# Patient Record
Sex: FEMALE | Race: WHITE | NOT HISPANIC OR LATINO | Employment: PART TIME | URBAN - NONMETROPOLITAN AREA
[De-identification: names, ages, dates, MRNs, and addresses within clinical notes are randomized per-mention and may not be internally consistent; named-entity substitution may affect disease eponyms.]

---

## 2017-08-15 ENCOUNTER — OFFICE VISIT (OUTPATIENT)
Dept: FAMILY MEDICINE CLINIC | Facility: CLINIC | Age: 22
End: 2017-08-15

## 2017-08-15 ENCOUNTER — LAB (OUTPATIENT)
Dept: LAB | Facility: OTHER | Age: 22
End: 2017-08-15

## 2017-08-15 VITALS
TEMPERATURE: 98.6 F | DIASTOLIC BLOOD PRESSURE: 62 MMHG | SYSTOLIC BLOOD PRESSURE: 128 MMHG | BODY MASS INDEX: 23.63 KG/M2 | OXYGEN SATURATION: 98 % | HEART RATE: 92 BPM | HEIGHT: 66 IN | WEIGHT: 147 LBS

## 2017-08-15 DIAGNOSIS — R03.1 LOW BLOOD PRESSURE, NOT HYPOTENSION: ICD-10-CM

## 2017-08-15 DIAGNOSIS — R53.83 FATIGUE, UNSPECIFIED TYPE: ICD-10-CM

## 2017-08-15 DIAGNOSIS — R03.1 LOW BLOOD PRESSURE, NOT HYPOTENSION: Primary | ICD-10-CM

## 2017-08-15 LAB
ANION GAP SERPL CALCULATED.3IONS-SCNC: 10 MMOL/L (ref 5–15)
BASOPHILS # BLD AUTO: 0.01 10*3/MM3 (ref 0–0.2)
BASOPHILS NFR BLD AUTO: 0.2 % (ref 0–2)
BUN BLD-MCNC: 11 MG/DL (ref 8–25)
BUN/CREAT SERPL: 12.2 (ref 7–25)
CALCIUM SPEC-SCNC: 9.7 MG/DL (ref 8.4–10.8)
CHLORIDE SERPL-SCNC: 102 MMOL/L (ref 100–112)
CO2 SERPL-SCNC: 29 MMOL/L (ref 20–32)
CREAT BLD-MCNC: 0.9 MG/DL (ref 0.4–1.3)
DEPRECATED RDW RBC AUTO: 41.3 FL (ref 36.4–46.3)
EOSINOPHIL # BLD AUTO: 0.12 10*3/MM3 (ref 0–0.7)
EOSINOPHIL NFR BLD AUTO: 2 % (ref 0–7)
ERYTHROCYTE [DISTWIDTH] IN BLOOD BY AUTOMATED COUNT: 13.4 % (ref 11.5–14.5)
GFR SERPL CREATININE-BSD FRML MDRD: 79 ML/MIN/1.73 (ref 71–165)
GLUCOSE BLD-MCNC: 102 MG/DL (ref 70–100)
HCT VFR BLD AUTO: 42 % (ref 35–45)
HGB BLD-MCNC: 14.1 G/DL (ref 12–15.5)
LYMPHOCYTES # BLD AUTO: 1.18 10*3/MM3 (ref 0.6–4.2)
LYMPHOCYTES NFR BLD AUTO: 20.1 % (ref 10–50)
MCH RBC QN AUTO: 29 PG (ref 26.5–34)
MCHC RBC AUTO-ENTMCNC: 33.6 G/DL (ref 31.4–36)
MCV RBC AUTO: 86.4 FL (ref 80–98)
MONOCYTES # BLD AUTO: 0.59 10*3/MM3 (ref 0–0.9)
MONOCYTES NFR BLD AUTO: 10.1 % (ref 0–12)
NEUTROPHILS # BLD AUTO: 3.96 10*3/MM3 (ref 2–8.6)
NEUTROPHILS NFR BLD AUTO: 67.6 % (ref 37–80)
PLATELET # BLD AUTO: 255 10*3/MM3 (ref 150–450)
PMV BLD AUTO: 10.4 FL (ref 8–12)
POTASSIUM BLD-SCNC: 4.3 MMOL/L (ref 3.4–5.4)
RBC # BLD AUTO: 4.86 10*6/MM3 (ref 3.77–5.16)
SODIUM BLD-SCNC: 141 MMOL/L (ref 134–146)
WBC NRBC COR # BLD: 5.86 10*3/MM3 (ref 3.2–9.8)

## 2017-08-15 PROCEDURE — 82607 VITAMIN B-12: CPT | Performed by: NURSE PRACTITIONER

## 2017-08-15 PROCEDURE — 84443 ASSAY THYROID STIM HORMONE: CPT | Performed by: NURSE PRACTITIONER

## 2017-08-15 PROCEDURE — 84439 ASSAY OF FREE THYROXINE: CPT | Performed by: NURSE PRACTITIONER

## 2017-08-15 PROCEDURE — 82728 ASSAY OF FERRITIN: CPT | Performed by: NURSE PRACTITIONER

## 2017-08-15 PROCEDURE — 83540 ASSAY OF IRON: CPT | Performed by: NURSE PRACTITIONER

## 2017-08-15 PROCEDURE — 82306 VITAMIN D 25 HYDROXY: CPT | Performed by: NURSE PRACTITIONER

## 2017-08-15 PROCEDURE — 99213 OFFICE O/P EST LOW 20 MIN: CPT | Performed by: NURSE PRACTITIONER

## 2017-08-15 PROCEDURE — 85025 COMPLETE CBC W/AUTO DIFF WBC: CPT | Performed by: NURSE PRACTITIONER

## 2017-08-15 PROCEDURE — 80048 BASIC METABOLIC PNL TOTAL CA: CPT | Performed by: NURSE PRACTITIONER

## 2017-08-15 PROCEDURE — 36415 COLL VENOUS BLD VENIPUNCTURE: CPT | Performed by: NURSE PRACTITIONER

## 2017-08-15 PROCEDURE — 82746 ASSAY OF FOLIC ACID SERUM: CPT | Performed by: NURSE PRACTITIONER

## 2017-08-15 NOTE — PROGRESS NOTES
Subjective   Tania Vital is a 21 y.o. female who presents to the office stating that she was unable to donate blood today because her blood pressure was too low, taken at 118/46.  Iron level was also checked by a fingerstick, was not informed it was low.  Denies that she feels bad.  Feels that she drinks enough fluids during the day.  Denies dizziness, lightheadedness.  Denies chest pain, shortness of breath.  Is in college and works several jobs.  Will nap often, whenever she can.  Mother present during exam.    History of Present Illness     The following portions of the patient's history were reviewed and updated as appropriate: allergies, current medications, past family history, past medical history, past social history, past surgical history and problem list.    Review of Systems   Constitutional: Positive for fatigue. Negative for chills and fever.   HENT: Negative for congestion, sneezing, sore throat and trouble swallowing.    Eyes: Negative for visual disturbance.   Respiratory: Negative for cough, chest tightness, shortness of breath and wheezing.    Cardiovascular: Negative for chest pain, palpitations and leg swelling.   Gastrointestinal: Negative for abdominal pain, constipation, diarrhea, nausea and vomiting.   Genitourinary: Negative for dysuria, frequency and urgency.   Musculoskeletal: Negative for neck pain.   Skin: Negative for rash.   Neurological: Negative for dizziness, weakness and headaches.   Psychiatric/Behavioral:        In the past two weeks the pt has not felt down, depressed, hopeless or lost interest in doing things   All other systems reviewed and are negative.      Objective   Physical Exam   Constitutional: She is oriented to person, place, and time. She appears well-developed and well-nourished. She is cooperative. She does not have a sickly appearance. She does not appear ill.   HENT:   Head: Normocephalic and atraumatic.   Right Ear: External ear normal.   Left Ear: External  ear normal.   Nose: Nose normal.   Mouth/Throat: Uvula is midline, oropharynx is clear and moist and mucous membranes are normal.   Eyes: Conjunctivae, EOM and lids are normal. Pupils are equal, round, and reactive to light. Right eye exhibits no discharge. Left eye exhibits no discharge. No scleral icterus.   Neck: Trachea normal, normal range of motion and phonation normal. Neck supple. No thyromegaly present.   Cardiovascular: Normal rate, regular rhythm, normal heart sounds and intact distal pulses.  Exam reveals no gallop and no friction rub.    No murmur heard.  Orthostatic BP readings:  Lying 122/64, sitting 120/68 and standing 120/62   Pulmonary/Chest: Effort normal and breath sounds normal. No respiratory distress. She has no wheezes. She has no rales.   Abdominal: Soft. Normal appearance and bowel sounds are normal. She exhibits no distension. There is no tenderness. There is no rebound and no guarding.   Musculoskeletal: Normal range of motion. She exhibits no edema.   Lymphadenopathy:     She has no cervical adenopathy.   Neurological: She is alert and oriented to person, place, and time. No cranial nerve deficit. GCS eye subscore is 4. GCS verbal subscore is 5. GCS motor subscore is 6.   Skin: Skin is warm, dry and intact. No rash noted.   Psychiatric: She has a normal mood and affect. Her behavior is normal. Judgment and thought content normal.   Nursing note and vitals reviewed.      Assessment/Plan   Tania was seen today for follow-up.    Diagnoses and all orders for this visit:    Low blood pressure, not hypotension  -     CBC & Differential; Future  -     T4, Free; Future  -     TSH; Future  -     Vitamin B12; Future  -     Vitamin D 25 Hydroxy; Future  -     Basic Metabolic Panel  -     Iron  -     Ferritin  -     Folate    Fatigue, unspecified type  -     CBC & Differential; Future  -     T4, Free; Future  -     TSH; Future  -     Vitamin B12; Future  -     Vitamin D 25 Hydroxy; Future  -      Basic Metabolic Panel  -     Iron  -     Ferritin  -     Folate

## 2017-08-16 LAB
25(OH)D3 SERPL-MCNC: 51.4 NG/ML (ref 30–100)
FERRITIN SERPL-MCNC: 30.7 NG/ML (ref 6.2–137)
FOLATE SERPL-MCNC: 7.04 NG/ML (ref 2.76–21)
IRON 24H UR-MRATE: 104 MCG/DL (ref 37–170)
T4 FREE SERPL-MCNC: 1.07 NG/DL (ref 0.78–2.19)
TSH SERPL DL<=0.05 MIU/L-ACNC: 3.59 MIU/ML (ref 0.46–4.68)
VIT B12 BLD-MCNC: 420 PG/ML (ref 239–931)

## 2018-09-24 ENCOUNTER — OFFICE VISIT (OUTPATIENT)
Dept: FAMILY MEDICINE CLINIC | Facility: CLINIC | Age: 23
End: 2018-09-24

## 2018-09-24 VITALS
SYSTOLIC BLOOD PRESSURE: 122 MMHG | OXYGEN SATURATION: 99 % | DIASTOLIC BLOOD PRESSURE: 68 MMHG | HEIGHT: 66 IN | WEIGHT: 154.2 LBS | HEART RATE: 74 BPM | TEMPERATURE: 98.8 F | BODY MASS INDEX: 24.78 KG/M2

## 2018-09-24 DIAGNOSIS — J32.9 SINUSITIS, UNSPECIFIED CHRONICITY, UNSPECIFIED LOCATION: Primary | ICD-10-CM

## 2018-09-24 PROCEDURE — 99213 OFFICE O/P EST LOW 20 MIN: CPT | Performed by: NURSE PRACTITIONER

## 2018-09-24 RX ORDER — AZITHROMYCIN 250 MG/1
TABLET, FILM COATED ORAL
Qty: 6 TABLET | Refills: 0 | Status: SHIPPED | OUTPATIENT
Start: 2018-09-24 | End: 2019-02-18

## 2018-10-08 PROBLEM — J32.9 SINUSITIS: Status: ACTIVE | Noted: 2018-10-08

## 2018-10-09 NOTE — PROGRESS NOTES
Subjective   Tania Vital is a 22 y.o. female who presents to the office complaining of sinus pressure that started last Thursday.  Does use nasal spray and has also been taking a daytime cold and flu product.  Tells me that her cough is worse in the morning.    History of Present Illness     The following portions of the patient's history were reviewed and updated as appropriate: allergies, current medications, past family history, past medical history, past social history, past surgical history and problem list.    Review of Systems   Constitutional: Negative for chills, fatigue and fever.   HENT: Positive for sinus pressure. Negative for congestion, sneezing, sore throat and trouble swallowing.    Eyes: Negative for visual disturbance.   Respiratory: Negative for cough, chest tightness, shortness of breath and wheezing.    Cardiovascular: Negative for chest pain, palpitations and leg swelling.   Gastrointestinal: Negative for abdominal pain, constipation, diarrhea, nausea and vomiting.   Genitourinary: Negative for dysuria, frequency and urgency.   Musculoskeletal: Negative for neck pain.   Skin: Negative for rash.   Neurological: Negative for dizziness, weakness and headaches.   Psychiatric/Behavioral:        In the past two weeks the pt has not felt down, depressed, hopeless or lost interest in doing things   All other systems reviewed and are negative.      Objective   Physical Exam   Constitutional: She is oriented to person, place, and time. She appears well-developed and well-nourished. She is cooperative.  Non-toxic appearance. She does not have a sickly appearance.   HENT:   Head: Normocephalic and atraumatic.   Right Ear: External ear normal.   Left Ear: External ear normal.   Nose: Right sinus exhibits maxillary sinus tenderness. Left sinus exhibits maxillary sinus tenderness.   Mouth/Throat: Uvula is midline, oropharynx is clear and moist and mucous membranes are normal.   Eyes: Pupils are equal,  round, and reactive to light. Conjunctivae, EOM and lids are normal. Right eye exhibits no discharge. Left eye exhibits no discharge. No scleral icterus.   Neck: Trachea normal, normal range of motion and phonation normal. Neck supple. No thyromegaly present.   Cardiovascular: Normal rate, regular rhythm, normal heart sounds and intact distal pulses.  Exam reveals no gallop and no friction rub.    No murmur heard.  Pulmonary/Chest: Effort normal and breath sounds normal. No respiratory distress. She has no wheezes. She has no rales.   Abdominal: Soft. Bowel sounds are normal. She exhibits no distension. There is no tenderness. There is no rebound and no guarding.   Musculoskeletal: Normal range of motion. She exhibits no edema.   Lymphadenopathy:     She has no cervical adenopathy.   Neurological: She is alert and oriented to person, place, and time. No cranial nerve deficit. GCS eye subscore is 4. GCS verbal subscore is 5. GCS motor subscore is 6.   Skin: Skin is warm and dry. No rash noted.   Psychiatric: She has a normal mood and affect. Her behavior is normal. Judgment and thought content normal.   Nursing note and vitals reviewed.      Assessment/Plan   Tania was seen today for sinusitis.    Diagnoses and all orders for this visit:    Sinusitis, unspecified chronicity, unspecified location    Other orders  -     loratadine-pseudoephedrine (GNP LORATADINE-D 12HR) 5-120 MG per 12 hr tablet; Take 1 tablet by mouth Every Morning.  -     azithromycin (ZITHROMAX Z-ANEESH) 250 MG tablet; Take 2 tablets the first day, then 1 tablet daily for 4 days.    Encouraged to cough and deep breathe.  Good handwashing.  No smoke exposure.

## 2019-04-23 ENCOUNTER — OFFICE VISIT (OUTPATIENT)
Dept: FAMILY MEDICINE CLINIC | Facility: CLINIC | Age: 24
End: 2019-04-23

## 2019-04-23 VITALS
HEIGHT: 66 IN | SYSTOLIC BLOOD PRESSURE: 110 MMHG | TEMPERATURE: 98.1 F | WEIGHT: 160 LBS | OXYGEN SATURATION: 99 % | HEART RATE: 67 BPM | DIASTOLIC BLOOD PRESSURE: 70 MMHG | BODY MASS INDEX: 25.71 KG/M2 | RESPIRATION RATE: 16 BRPM

## 2019-04-23 DIAGNOSIS — K58.0 IRRITABLE BOWEL SYNDROME WITH DIARRHEA: ICD-10-CM

## 2019-04-23 DIAGNOSIS — Z13.0 SCREENING FOR DEFICIENCY ANEMIA: Primary | ICD-10-CM

## 2019-04-23 DIAGNOSIS — Z13.1 SCREENING FOR DIABETES MELLITUS: ICD-10-CM

## 2019-04-23 DIAGNOSIS — N92.6 IRREGULAR MENSTRUAL CYCLE: ICD-10-CM

## 2019-04-23 DIAGNOSIS — Z13.29 SCREENING FOR THYROID DISORDER: ICD-10-CM

## 2019-04-23 DIAGNOSIS — J30.1 ALLERGIC RHINITIS DUE TO POLLEN, UNSPECIFIED SEASONALITY: ICD-10-CM

## 2019-04-23 DIAGNOSIS — N93.9 EPISODE OF HEAVY VAGINAL BLEEDING: ICD-10-CM

## 2019-04-23 DIAGNOSIS — Z13.220 SCREENING FOR HYPERLIPIDEMIA: ICD-10-CM

## 2019-04-23 LAB
ALBUMIN SERPL-MCNC: 4.3 G/DL (ref 3.5–5)
ALBUMIN/GLOB SERPL: 1.6 G/DL (ref 1.1–1.8)
ALP SERPL-CCNC: 56 U/L (ref 38–126)
ALT SERPL W P-5'-P-CCNC: 14 U/L
ANION GAP SERPL CALCULATED.3IONS-SCNC: 6 MMOL/L (ref 5–15)
AST SERPL-CCNC: 16 U/L (ref 14–36)
BASOPHILS # BLD AUTO: 0.02 10*3/MM3 (ref 0–0.2)
BASOPHILS NFR BLD AUTO: 0.3 % (ref 0–1.5)
BILIRUB SERPL-MCNC: 0.3 MG/DL (ref 0.2–1.3)
BUN BLD-MCNC: 9 MG/DL (ref 7–23)
BUN/CREAT SERPL: 12.3 (ref 7–25)
CALCIUM SPEC-SCNC: 9 MG/DL (ref 8.4–10.2)
CHLORIDE SERPL-SCNC: 104 MMOL/L (ref 101–112)
CHOLEST SERPL-MCNC: 154 MG/DL (ref 150–200)
CO2 SERPL-SCNC: 29 MMOL/L (ref 22–30)
CREAT BLD-MCNC: 0.73 MG/DL (ref 0.52–1.04)
DEPRECATED RDW RBC AUTO: 44.4 FL (ref 37–54)
EOSINOPHIL # BLD AUTO: 0.14 10*3/MM3 (ref 0–0.4)
EOSINOPHIL NFR BLD AUTO: 1.9 % (ref 0.3–6.2)
ERYTHROCYTE [DISTWIDTH] IN BLOOD BY AUTOMATED COUNT: 14.2 % (ref 12.3–15.4)
GFR SERPL CREATININE-BSD FRML MDRD: 99 ML/MIN/1.73 (ref 71–165)
GLOBULIN UR ELPH-MCNC: 2.7 GM/DL (ref 2.3–3.5)
GLUCOSE BLD-MCNC: 93 MG/DL (ref 70–99)
HCT VFR BLD AUTO: 42.3 % (ref 34–46.6)
HDLC SERPL-MCNC: 42 MG/DL (ref 40–59)
HGB BLD-MCNC: 14.1 G/DL (ref 12–15.9)
LDLC SERPL CALC-MCNC: 66 MG/DL
LDLC/HDLC SERPL: 1.58 {RATIO} (ref 0–3.22)
LYMPHOCYTES # BLD AUTO: 1.51 10*3/MM3 (ref 0.7–3.1)
LYMPHOCYTES NFR BLD AUTO: 20 % (ref 19.6–45.3)
MCH RBC QN AUTO: 29 PG (ref 26.6–33)
MCHC RBC AUTO-ENTMCNC: 33.3 G/DL (ref 31.5–35.7)
MCV RBC AUTO: 86.9 FL (ref 79–97)
MONOCYTES # BLD AUTO: 0.84 10*3/MM3 (ref 0.1–0.9)
MONOCYTES NFR BLD AUTO: 11.1 % (ref 5–12)
NEUTROPHILS # BLD AUTO: 5.05 10*3/MM3 (ref 1.7–7)
NEUTROPHILS NFR BLD AUTO: 66.7 % (ref 42.7–76)
PLATELET # BLD AUTO: 267 10*3/MM3 (ref 140–450)
PMV BLD AUTO: 9.9 FL (ref 6–12)
POTASSIUM BLD-SCNC: 4.1 MMOL/L (ref 3.4–5)
PROT SERPL-MCNC: 7 G/DL (ref 6.3–8.6)
RBC # BLD AUTO: 4.87 10*6/MM3 (ref 3.77–5.28)
SODIUM BLD-SCNC: 139 MMOL/L (ref 137–145)
TRIGL SERPL-MCNC: 229 MG/DL
VLDLC SERPL-MCNC: 45.8 MG/DL
WBC NRBC COR # BLD: 7.56 10*3/MM3 (ref 3.4–10.8)

## 2019-04-23 PROCEDURE — 84439 ASSAY OF FREE THYROXINE: CPT | Performed by: NURSE PRACTITIONER

## 2019-04-23 PROCEDURE — 36415 COLL VENOUS BLD VENIPUNCTURE: CPT | Performed by: NURSE PRACTITIONER

## 2019-04-23 PROCEDURE — 99214 OFFICE O/P EST MOD 30 MIN: CPT | Performed by: NURSE PRACTITIONER

## 2019-04-23 PROCEDURE — 80061 LIPID PANEL: CPT | Performed by: NURSE PRACTITIONER

## 2019-04-23 PROCEDURE — 85025 COMPLETE CBC W/AUTO DIFF WBC: CPT | Performed by: NURSE PRACTITIONER

## 2019-04-23 PROCEDURE — 84443 ASSAY THYROID STIM HORMONE: CPT | Performed by: NURSE PRACTITIONER

## 2019-04-23 PROCEDURE — 80053 COMPREHEN METABOLIC PANEL: CPT | Performed by: NURSE PRACTITIONER

## 2019-04-23 RX ORDER — TRIAMCINOLONE ACETONIDE 55 UG/1
2 SPRAY, METERED NASAL DAILY
Qty: 16.5 G | Refills: 5 | Status: SHIPPED | OUTPATIENT
Start: 2019-04-23 | End: 2019-10-31

## 2019-04-23 RX ORDER — DICYCLOMINE HYDROCHLORIDE 10 MG/1
10 CAPSULE ORAL 3 TIMES DAILY PRN
Qty: 60 CAPSULE | Refills: 5 | Status: SHIPPED | OUTPATIENT
Start: 2019-04-23 | End: 2019-07-23

## 2019-04-23 RX ORDER — NORETHINDRONE ACETATE AND ETHINYL ESTRADIOL 1MG-20(21)
1 KIT ORAL DAILY
Qty: 28 TABLET | Refills: 2 | Status: SHIPPED | OUTPATIENT
Start: 2019-04-23 | End: 2019-07-18

## 2019-04-23 RX ORDER — TRIAMCINOLONE ACETONIDE 55 UG/1
2 SPRAY, METERED NASAL DAILY
COMMUNITY
End: 2019-04-23 | Stop reason: SDUPTHER

## 2019-04-24 LAB
T4 FREE SERPL-MCNC: 1.32 NG/DL (ref 0.93–1.7)
TSH SERPL DL<=0.05 MIU/L-ACNC: 2.57 MIU/ML (ref 0.27–4.2)

## 2019-04-25 ENCOUNTER — TELEPHONE (OUTPATIENT)
Dept: FAMILY MEDICINE CLINIC | Facility: CLINIC | Age: 24
End: 2019-04-25

## 2019-04-26 ENCOUNTER — TELEPHONE (OUTPATIENT)
Dept: FAMILY MEDICINE CLINIC | Facility: CLINIC | Age: 24
End: 2019-04-26

## 2019-05-09 PROBLEM — N92.6 IRREGULAR MENSTRUAL CYCLE: Status: ACTIVE | Noted: 2019-05-09

## 2019-05-09 PROBLEM — K58.0 IRRITABLE BOWEL SYNDROME WITH DIARRHEA: Status: ACTIVE | Noted: 2019-05-09

## 2019-05-09 PROBLEM — J30.1 ALLERGIC RHINITIS DUE TO POLLEN: Status: ACTIVE | Noted: 2019-05-09

## 2019-05-09 PROBLEM — N93.9 EPISODE OF HEAVY VAGINAL BLEEDING: Status: ACTIVE | Noted: 2019-05-09

## 2019-05-09 NOTE — PROGRESS NOTES
Subjective   Tania Vital is a 23 y.o. female who presents to the office to establish care.    History of Present Illness     Patient states when she was younger she had a lot of pain with eating, did come down since then but she usually has a problem once a month with one meal.  Describes as sharp crampy abdominal pain generalized with diarrhea, usually only a couple of episodes of diarrhea and then it resolves, states mucus in the diarrhea at times.  Has not tried anything for it.    Patient states some increased stress in her life but is handling it okay states she also has some increased fatigue as well.    Patient states that she has been having really bad cramps in her menstrual cycle for the past 17 days, she states that this is very abnormal for her to have at this long, usually she has a heavy.  Normally patient has heavy to mild periods that last about 3 days, cramping is always bad and they usually occur at the first of the month regularly.  This the first time she has had this along with her menstrual cycle.  Patient states only changes that she can recall is that she does have an increased amount of stress.  Patient is not on birth control.    Allergic rhinitis-chronic, controlled with Nasacort.    No past surgical history    Past medical history:  -Recurrent URIs/bad sinus issues    Family history:  -Mom-breast cancer at age 51 and thyroid issues, high cholesterol,, hypertension  -Grandma on dad side-MI at old age  -Grandma on mom's side-MI at old age  -History of weak starts in the family per patient  -Uncle and aunt-pacemaker  -Dad-hypertension, hyperlipidemia    The following portions of the patient's history were reviewed and updated as appropriate: allergies, current medications, past family history, past medical history, past social history, past surgical history and problem list.    Review of Systems   Constitutional: Positive for fatigue. Negative for activity change, appetite change,  "chills, diaphoresis, fever and unexpected weight change.   HENT: Negative for congestion, ear discharge, ear pain, nosebleeds, postnasal drip, rhinorrhea, sinus pressure, sinus pain, sneezing, sore throat, tinnitus and trouble swallowing.    Eyes: Negative.    Respiratory: Negative for cough, chest tightness, shortness of breath and wheezing.    Cardiovascular: Negative for chest pain, palpitations and leg swelling.   Gastrointestinal: Positive for abdominal pain and diarrhea. Negative for abdominal distention, blood in stool, constipation, nausea and vomiting.   Genitourinary: Positive for menstrual problem and vaginal bleeding. Negative for difficulty urinating, dyspareunia, dysuria, flank pain, frequency, hematuria, vaginal discharge and vaginal pain.   Musculoskeletal: Negative for arthralgias, back pain, gait problem, joint swelling and myalgias.   Skin: Negative for rash and wound.   Allergic/Immunologic: Negative for environmental allergies, food allergies and immunocompromised state.   Neurological: Negative for dizziness, tremors, seizures, syncope, weakness, light-headedness, numbness and headaches.   Hematological: Does not bruise/bleed easily.   Psychiatric/Behavioral: Negative for behavioral problems, self-injury, sleep disturbance and suicidal ideas. The patient is nervous/anxious.          Objective   /70   Pulse 67   Temp 98.1 °F (36.7 °C) (Temporal)   Resp 16   Ht 167.6 cm (66\")   Wt 72.6 kg (160 lb)   LMP 04/06/2019   SpO2 99%   Breastfeeding? No   BMI 25.82 kg/m²   Physical Exam   Constitutional: She is oriented to person, place, and time. She appears well-developed and well-nourished. She is cooperative. No distress.   Cardiovascular: Normal rate, regular rhythm, normal heart sounds and intact distal pulses. Exam reveals no gallop and no friction rub.   No murmur heard.  Pulmonary/Chest: Effort normal and breath sounds normal. No respiratory distress. She has no wheezes. She has no " rales.   Abdominal: Soft. Normal appearance and bowel sounds are normal. She exhibits no shifting dullness, no distension, no pulsatile liver, no fluid wave, no abdominal bruit, no ascites, no pulsatile midline mass and no mass. There is no hepatosplenomegaly. There is no tenderness. There is no rigidity, no rebound, no guarding and no CVA tenderness. No hernia.   Neurological: She is alert and oriented to person, place, and time. She is not disoriented. Coordination and gait normal.   Skin: Skin is warm, dry and intact. Capillary refill takes less than 2 seconds. She is not diaphoretic. No pallor.   Psychiatric: She has a normal mood and affect. Her speech is normal and behavior is normal. Thought content normal. She is not actively hallucinating. Cognition and memory are normal.   Patient is dressed appropriately for weather and situation, makes eye contact, and engages in conversation.  She is attentive.   Nursing note and vitals reviewed.       PHQ-2/PHQ-9 Depression Screening 4/23/2019   Little interest or pleasure in doing things 0   Feeling down, depressed, or hopeless 0   Total Score 0         Assessment/Plan   Tania was seen today for establish care.    Diagnoses and all orders for this visit:    Screening for deficiency anemia  -     CBC & Differential  -     CBC Auto Differential    Irritable bowel syndrome with diarrhea  -     dicyclomine (BENTYL) 10 MG capsule; Take 1 capsule by mouth 3 (Three) Times a Day As Needed (diarrhea/cramping).    Screening for diabetes mellitus  -     Comprehensive Metabolic Panel    Screening for thyroid disorder  -     T4, Free  -     TSH    Irregular menstrual cycle  -     norethindrone-ethinyl estradiol FE (JUNEL FE 1/20) 1-20 MG-MCG per tablet; Take 1 tablet by mouth Daily.    Screening for hyperlipidemia  -     Lipid Panel    Episode of heavy vaginal bleeding    Allergic rhinitis due to pollen, unspecified seasonality    Other orders  -     Triamcinolone Acetonide  (NASACORT) 55 MCG/ACT nasal inhaler; 2 sprays into the nostril(s) as directed by provider Daily.            IBS with diarrhea-chronic, with rare intermittent episodes.  Uncontrolled.  Will try Bentyl as needed for these.    Fatigue- acute, worsening.  Will do lab work to reflect this complaint.    Allergic rhinitis-chronic, controlled with Nasacort.    Irregular painful menstrual cycle/prolonged vaginal bleeding.-acute, not improving.  -Collaborated with OB/GYN Carrie Oconnell, will start patient on birth control pills, patient will follow-up if bleeding does not stop in the next week, ordered lab work to assess for anemia.  Follow-up in 3 months.    Ordered lab work for screening purposes and history, will call patient with results.  Vaginal bleeding vaginal bleeding    Patient educated to follow-up sooner than next scheduled appointment if condition(s) worse or do not improve. Patient states understanding and is in agreeance with plan of care. An After Visit Summary was printed and given to the patient.      LELIA Galvin        This document has been electronically signed by LELIA Galvin on May 9, 2019 6:38 PM      EMR/Transcription Dragon Disclaimer:  Some of this note may be an electronic dragon transcription/translation of spoken language to printed text. The electronic translation of spoken language may permit erroneous, or at times, nonsensical words or phrases to be inadvertently transcribed. Although I have reviewed the note for such errors, some may still exist.

## 2019-05-16 NOTE — TELEPHONE ENCOUNTER
Called spoke with mom looked in media to verify that it was okay to release information I did see what she had signed in her chart that said we could release info to he mom

## 2019-07-18 ENCOUNTER — TELEPHONE (OUTPATIENT)
Dept: FAMILY MEDICINE CLINIC | Facility: CLINIC | Age: 24
End: 2019-07-18

## 2019-07-18 RX ORDER — NORETHINDRONE ACETATE AND ETHINYL ESTRADIOL 1; .02 MG/1; MG/1
1 TABLET ORAL DAILY
Qty: 21 TABLET | Refills: 1 | Status: SHIPPED | OUTPATIENT
Start: 2019-07-18 | End: 2019-07-23 | Stop reason: SDUPTHER

## 2019-07-18 NOTE — TELEPHONE ENCOUNTER
Pt called in has appt with you on the 07/23/19 and she has ran out of birthcontrol today     Her insurance also doesn't cover the BC with iron so please order it with out iron I tried to put it in wouldn't work for me pharmacy has also been verified

## 2019-07-23 ENCOUNTER — OFFICE VISIT (OUTPATIENT)
Dept: FAMILY MEDICINE CLINIC | Facility: CLINIC | Age: 24
End: 2019-07-23

## 2019-07-23 VITALS
DIASTOLIC BLOOD PRESSURE: 70 MMHG | TEMPERATURE: 97.7 F | BODY MASS INDEX: 25.39 KG/M2 | HEIGHT: 67 IN | HEART RATE: 76 BPM | SYSTOLIC BLOOD PRESSURE: 110 MMHG | WEIGHT: 161.8 LBS | OXYGEN SATURATION: 99 % | RESPIRATION RATE: 16 BRPM

## 2019-07-23 DIAGNOSIS — R53.83 FATIGUE, UNSPECIFIED TYPE: ICD-10-CM

## 2019-07-23 DIAGNOSIS — N93.9 EPISODE OF HEAVY VAGINAL BLEEDING: Primary | ICD-10-CM

## 2019-07-23 DIAGNOSIS — Z30.41 ENCOUNTER FOR SURVEILLANCE OF CONTRACEPTIVE PILLS: ICD-10-CM

## 2019-07-23 DIAGNOSIS — K58.0 IRRITABLE BOWEL SYNDROME WITH DIARRHEA: ICD-10-CM

## 2019-07-23 DIAGNOSIS — N92.6 IRREGULAR MENSTRUAL CYCLE: ICD-10-CM

## 2019-07-23 PROCEDURE — 99214 OFFICE O/P EST MOD 30 MIN: CPT | Performed by: NURSE PRACTITIONER

## 2019-07-23 RX ORDER — NORETHINDRONE ACETATE AND ETHINYL ESTRADIOL 1; .02 MG/1; MG/1
1 TABLET ORAL DAILY
Qty: 21 TABLET | Refills: 11 | Status: SHIPPED | OUTPATIENT
Start: 2019-07-23 | End: 2020-05-11

## 2019-08-07 PROBLEM — Z30.41 ENCOUNTER FOR SURVEILLANCE OF CONTRACEPTIVE PILLS: Status: ACTIVE | Noted: 2019-08-07

## 2019-08-07 NOTE — PROGRESS NOTES
Subjective   Tania Vital is a 23 y.o. female who presents to the office to follow-up on menstrual issues, fatigue, and IBS.    History of Present Illness     IBS with diarrhea-chronic, with rare intermittent episodes.  Controlled with Bentyl and probiotic  -Patient states when she was younger she had a lot of pain with eating, did come down since then but she usually has a problem once a month with one meal.  Describes as sharp crampy abdominal pain generalized with diarrhea, usually only a couple of episodes of diarrhea and then it resolves, states mucus in the diarrhea at times.  Has not tried anything for it.  -7/23/2019: Patient states that her mom told her to start on antibiotic which she started about 2 weeks ago taking Bentyl as needed and has had no trouble.  We will follow-up if issues.    Fatigue- acute, improving over time.  -Lab work-up done and within normal limits.    Irregular painful menstrual cycle/prolonged vaginal bleeding.-acute, improving on Janel 1/20  -At last visit: Patient states that she has been having really bad cramps in her menstrual cycle for the past 17 days, she states that this is very abnormal for her to have at this long, usually she has a heavy.  Normally patient has heavy to mild periods that last about 3 days, cramping is always bad and they usually occur at the first of the month regularly.  This the first time she has had this along with her menstrual cycle.  Patient states only changes that she can recall is that she does have an increased amount of stress.  Patient is not on birth control.  Plan of care: Collaborated with OB/GYN Carrie Oconnell, will start patient on birth control pills, patient will follow-up if bleeding does not stop in the next week, ordered lab work to assess for anemia.  Follow-up in 3 months.  -7/23/2019: Patient states she is having regular menstrual cycles and no longer prolonged.  States doing well on birth control.    Patient needs  tetanus shot does not want to get it done today will call to set up an appointment to get done.    Past medical history:  Allergic rhinitis-chronic, controlled with Nasacort.  -Recurrent URIs/bad sinus issues    Family history:  -Mom-breast cancer at age 51 and thyroid issues, high cholesterol,, hypertension  -Grandma on dad side-MI at old age  -Grandma on mom's side-MI at old age  -History of weak starts in the family per patient  -Uncle and aunt-pacemaker  -Dad-hypertension, hyperlipidemia    The following portions of the patient's history were reviewed and updated as appropriate: allergies, current medications, past family history, past medical history, past social history, past surgical history and problem list.    Review of Systems   Constitutional: Positive for fatigue (Improving). Negative for activity change, appetite change, chills, diaphoresis, fever and unexpected weight change.   HENT: Negative for congestion, ear discharge, ear pain, nosebleeds, postnasal drip, rhinorrhea, sinus pressure, sinus pain, sneezing, sore throat, tinnitus and trouble swallowing.    Eyes: Negative.    Respiratory: Negative for cough, chest tightness, shortness of breath and wheezing.    Cardiovascular: Negative for chest pain, palpitations and leg swelling.   Gastrointestinal: Negative for abdominal distention, abdominal pain, blood in stool, constipation, diarrhea, nausea and vomiting.   Genitourinary: Negative for difficulty urinating, dyspareunia, dysuria, flank pain, frequency, hematuria, menstrual problem, vaginal bleeding, vaginal discharge and vaginal pain.   Musculoskeletal: Negative for arthralgias, back pain, gait problem, joint swelling and myalgias.   Skin: Negative for rash and wound.   Allergic/Immunologic: Negative for environmental allergies, food allergies and immunocompromised state.   Neurological: Negative for dizziness, tremors, seizures, syncope, weakness, light-headedness, numbness and headaches.  "  Hematological: Does not bruise/bleed easily.   Psychiatric/Behavioral: Negative for behavioral problems, self-injury, sleep disturbance and suicidal ideas. The patient is nervous/anxious (Improving).          Objective   /70   Pulse 76   Temp 97.7 °F (36.5 °C) (Temporal)   Resp 16   Ht 168.9 cm (66.5\")   Wt 73.4 kg (161 lb 12.8 oz)   LMP 07/20/2019   SpO2 99%   BMI 25.72 kg/m²   Physical Exam   Constitutional: She is oriented to person, place, and time. She appears well-developed and well-nourished. She is cooperative. No distress.   Cardiovascular: Normal rate, regular rhythm, normal heart sounds and intact distal pulses. Exam reveals no gallop and no friction rub.   No murmur heard.  Pulmonary/Chest: Effort normal and breath sounds normal. No respiratory distress. She has no wheezes. She has no rales.   Abdominal: Soft. Normal appearance and bowel sounds are normal. She exhibits no shifting dullness, no distension, no pulsatile liver, no fluid wave, no abdominal bruit, no ascites, no pulsatile midline mass and no mass. There is no hepatosplenomegaly. There is no tenderness. There is no rigidity, no rebound, no guarding and no CVA tenderness. No hernia.   Neurological: She is alert and oriented to person, place, and time. She is not disoriented. Coordination and gait normal.   Skin: Skin is warm, dry and intact. Capillary refill takes less than 2 seconds. She is not diaphoretic. No pallor.   Psychiatric: She has a normal mood and affect. Her speech is normal and behavior is normal. Thought content normal. She is not actively hallucinating. Cognition and memory are normal.   Patient is dressed appropriately for weather and situation, makes eye contact, and engages in conversation.  She is attentive.   Nursing note and vitals reviewed.       PHQ-2/PHQ-9 Depression Screening 4/23/2019   Little interest or pleasure in doing things 0   Feeling down, depressed, or hopeless 0   Total Score 0 "         Assessment/Plan   Tania was seen today for follow-up.    Diagnoses and all orders for this visit:    Episode of heavy vaginal bleeding    Irregular menstrual cycle    Fatigue, unspecified type    Encounter for surveillance of contraceptive pills    Irritable bowel syndrome with diarrhea    Other orders  -     norethindrone-ethinyl estradiol (JUNEL 1/20) 1-20 MG-MCG per tablet; Take 1 tablet by mouth Daily.            IBS with diarrhea-chronic, with rare intermittent episodes.  Controlled with Bentyl and probiotic    Fatigue- acute, improving over time.  -Lab work-up done and within normal limits.    Irregular painful menstrual cycle/prolonged vaginal bleeding.-acute, controlled on Janel 1/20    Patient needs tetanus shot does not want to get it done today will call to set up an appointment to get done.    Follow-up in 1 year or sooner if needed    Patient educated to follow-up sooner than next scheduled appointment if condition(s) worse or do not improve. Patient states understanding and is in agreeance with plan of care. An After Visit Summary was printed and given to the patient.      LELIA Galvin        This document has been electronically signed by LELIA Galvin on August 7, 2019 11:54 AM      EMR/Transcription Dragon Disclaimer:  Some of this note may be an electronic dragon transcription/translation of spoken language to printed text. The electronic translation of spoken language may permit erroneous, or at times, nonsensical words or phrases to be inadvertently transcribed. Although I have reviewed the note for such errors, some may still exist.

## 2020-05-11 ENCOUNTER — OFFICE VISIT (OUTPATIENT)
Dept: FAMILY MEDICINE CLINIC | Facility: CLINIC | Age: 25
End: 2020-05-11

## 2020-05-11 DIAGNOSIS — R10.84 ABDOMINAL PAIN, GENERALIZED: ICD-10-CM

## 2020-05-11 DIAGNOSIS — R19.7 DIARRHEA, UNSPECIFIED TYPE: Primary | ICD-10-CM

## 2020-05-11 DIAGNOSIS — K58.0 IRRITABLE BOWEL SYNDROME WITH DIARRHEA: ICD-10-CM

## 2020-05-11 PROCEDURE — 99214 OFFICE O/P EST MOD 30 MIN: CPT | Performed by: NURSE PRACTITIONER

## 2020-05-11 RX ORDER — SUCRALFATE 1 G/1
1 TABLET ORAL 3 TIMES DAILY PRN
Qty: 90 TABLET | Refills: 0 | Status: SHIPPED | OUTPATIENT
Start: 2020-05-11 | End: 2020-06-12 | Stop reason: SDUPTHER

## 2020-05-11 RX ORDER — OMEPRAZOLE 40 MG/1
40 CAPSULE, DELAYED RELEASE ORAL DAILY
Qty: 30 CAPSULE | Refills: 0 | Status: SHIPPED | OUTPATIENT
Start: 2020-05-11 | End: 2020-06-12 | Stop reason: SDUPTHER

## 2020-05-11 RX ORDER — DICYCLOMINE HCL 20 MG
TABLET ORAL
Qty: 90 TABLET | Refills: 0 | Status: SHIPPED | OUTPATIENT
Start: 2020-05-11 | End: 2020-06-12 | Stop reason: SDUPTHER

## 2020-05-11 NOTE — PROGRESS NOTES
Subjective   Tania Vital is a 24 y.o. female who presents via telephone visit due to covid-19 for stomach aches and diarrhea x two weeks.     History of Present Illness     You have chosen to receive care through a telephone visit. Do you consent to use a telephone visit for your medical care today? Yes    Telephone visit lasted 13 mins.     IBS with diarrhea-chronic, with rare intermittent episodes.  -currently not taking with Bentyl and probiotic  -Patient states when she was younger she had a lot of pain with eating, did come down since then but she usually has a problem once a month with one meal.  Describes as sharp crampy abdominal pain generalized with diarrhea, usually only a couple of episodes of diarrhea and then it resolves, states mucus in the diarrhea at times.  Has not tried anything for it.  -7/23/2019: Patient states that her mom told her to start on antibiotic which she started about 2 weeks ago taking Bentyl as needed and has had no trouble.  We will follow-up if issues.  -5/11/20: pt states not the same thing as ibs falre up like what she is dcurrently having, see note below.     Stomach pain/diarrhea-acute, new problem, not improving  -5/11/20: x two weeks and now mucus like. Usually not this bad with IBS. Going 6-7x/days, sometimes doesn't have to eat to flare it up. Usually with IBS, it is only if she eats. Pain in stomach before ibs as well, stomach has been more irritated past few weeks but not sure why. Some relief of abd pain with defecation but only lasts 30mins or so. abd pain is sharp pain and all over and having some pain near ribs on right side and that just started yesterday. Diarrhea at first but consistency still different. Food does not make a difference on pain/diarrhea.   -off bentyl and probiotics at this time. Currently only taking daily vitamin and nasal allergy spray daily, off junel. Prefer meds first then work up, states hx of GB disease in family.   -POC: omeprazole  daily, bentyl and carafate prn. Labs (amylase/lipase/cbc/cmp/sed rate/hpylori and maybe stool specimen or gi panel) and u/s GB if not resolving, pt will call for these and refills if needed. Suggested to f/u if blood in stool. Drink plenty of water to stay hydrated. Drink 1-2 gatorades daily to replace electrolytes lost with diarrhea. F/u regardless if not mproving.     F/u if not improving.     __________________________________________________      Past Medical History:  Fatigue- acute, improving over time.  -Lab work-up done and within normal limi  Irregular painful menstrual cycle/prolonged vaginal bleeding.-acute, improving on Janel 1/20  -At last visit: Patient states that she has been having really bad cramps in her menstrual cycle for the past 17 days, she states that this is very abnormal for her to have at this long, usually she has a heavy.  Normally patient has heavy to mild periods that last about 3 days, cramping is always bad and they usually occur at the first of the month regularly.  This the first time she has had this along with her menstrual cycle.  Patient states only changes that she can recall is that she does have an increased amount of stress.  Patient is not on birth control.  Plan of care: Collaborated with OB/GYN Carrie Oconnell, will start patient on birth control pills, patient will follow-up if bleeding does not stop in the next week, ordered lab work to assess for anemia.  Follow-up in 3 months.  -7/23/2019: Patient states she is having regular menstrual cycles and no longer prolonged.  States doing well on birth control.  Allergic rhinitis-chronic, controlled with Nasacort.  -Recurrent URIs/bad sinus issues    Family history:  -Mom-breast cancer at age 51 and thyroid issues, high cholesterol,, hypertension  -Grandma on dad side-MI at old age  -Grandma on mom's side-MI at old age  -History of weak starts in the family per patient  -Uncle and aunt-pacemaker  -Dad-hypertension,  hyperlipidemia  -fam hx of GB disease    The following portions of the patient's history were reviewed and updated as appropriate: allergies, current medications, past family history, past medical history, past social history, past surgical history and problem list.    Review of Systems   Constitutional: Positive for appetite change and fatigue (Improving). Negative for activity change, chills, diaphoresis, fever and unexpected weight change.   HENT: Negative for congestion, ear discharge, ear pain, nosebleeds, postnasal drip, rhinorrhea, sinus pressure, sinus pain, sneezing, sore throat, tinnitus and trouble swallowing.    Eyes: Negative.    Respiratory: Negative for cough, chest tightness, shortness of breath and wheezing.    Cardiovascular: Negative for chest pain, palpitations and leg swelling.   Gastrointestinal: Positive for abdominal pain and diarrhea. Negative for abdominal distention, blood in stool, constipation, nausea and vomiting.   Genitourinary: Negative for difficulty urinating, dyspareunia, dysuria, flank pain, frequency, hematuria, menstrual problem, vaginal bleeding, vaginal discharge and vaginal pain.   Musculoskeletal: Negative for arthralgias, back pain, gait problem, joint swelling and myalgias.   Skin: Negative for rash and wound.   Allergic/Immunologic: Negative for environmental allergies, food allergies and immunocompromised state.   Neurological: Negative for dizziness, tremors, seizures, syncope, weakness, light-headedness, numbness and headaches.   Hematological: Does not bruise/bleed easily.   Psychiatric/Behavioral: Negative for behavioral problems, self-injury, sleep disturbance and suicidal ideas. The patient is nervous/anxious (Improving).          Objective   There were no vitals taken for this visit.Unable to complete physical exam due to telephone visit, pt is alert and oriented to person, she is pleasant and cooperative and tone of voice is consistent with previous visits in  the past.     PHQ-2/PHQ-9 Depression Screening 4/23/2019   Little interest or pleasure in doing things 0   Feeling down, depressed, or hopeless 0   Total Score 0         Assessment/Plan   Diagnoses and all orders for this visit:    Diarrhea, unspecified type  -     omeprazole (priLOSEC) 40 MG capsule; Take 1 capsule by mouth Daily.  -     sucralfate (CARAFATE) 1 g tablet; Take 1 tablet by mouth 3 (Three) Times a Day As Needed (stomach pain). Separate from vitamin by two hrs.  -     dicyclomine (Bentyl) 20 MG tablet; Take 1/2-1tab tid prn for diarrhea/abd pain.    Abdominal pain, generalized  -     omeprazole (priLOSEC) 40 MG capsule; Take 1 capsule by mouth Daily.  -     sucralfate (CARAFATE) 1 g tablet; Take 1 tablet by mouth 3 (Three) Times a Day As Needed (stomach pain). Separate from vitamin by two hrs.  -     dicyclomine (Bentyl) 20 MG tablet; Take 1/2-1tab tid prn for diarrhea/abd pain.    Irritable bowel syndrome with diarrhea            Plan of Care:    Please See History of Present Illness(HPI) above for Plan of Care (POC) for individualized diagnoses as well as when to follow up.       Patient educated to follow-up sooner than next scheduled appointment if condition(s) worse or do not improve. Patient states understanding and is in agreeance with plan of care. An After Visit Summary was printed and given to the patient.      LELIA Galvin        This document has been electronically signed by LELIA Galvin on May 11, 2020 12:04      EMR/Transcription Dragon Disclaimer:  Some of this note may be an electronic dragon transcription/translation of spoken language to printed text. The electronic translation of spoken language may permit erroneous, or at times, nonsensical words or phrases to be inadvertently transcribed. Although I have reviewed the note for such errors, some may still exist.

## 2020-05-15 ENCOUNTER — TELEPHONE (OUTPATIENT)
Dept: FAMILY MEDICINE CLINIC | Facility: CLINIC | Age: 25
End: 2020-05-15

## 2020-05-18 ENCOUNTER — TELEPHONE (OUTPATIENT)
Dept: FAMILY MEDICINE CLINIC | Facility: CLINIC | Age: 25
End: 2020-05-18

## 2020-05-18 ENCOUNTER — LAB (OUTPATIENT)
Dept: LAB | Facility: OTHER | Age: 25
End: 2020-05-18

## 2020-05-18 DIAGNOSIS — R10.84 ABDOMINAL PAIN, GENERALIZED: ICD-10-CM

## 2020-05-18 DIAGNOSIS — R19.7 DIARRHEA, UNSPECIFIED TYPE: Primary | ICD-10-CM

## 2020-05-18 DIAGNOSIS — R19.7 DIARRHEA, UNSPECIFIED TYPE: ICD-10-CM

## 2020-05-18 LAB
ALBUMIN SERPL-MCNC: 4.3 G/DL (ref 3.5–5)
ALBUMIN/GLOB SERPL: 1.5 G/DL (ref 1.1–1.8)
ALP SERPL-CCNC: 50 U/L (ref 38–126)
ALT SERPL W P-5'-P-CCNC: 12 U/L
AMYLASE SERPL-CCNC: 63 U/L (ref 30–110)
ANION GAP SERPL CALCULATED.3IONS-SCNC: 7 MMOL/L (ref 5–15)
AST SERPL-CCNC: 18 U/L (ref 14–36)
BASOPHILS # BLD AUTO: 0.02 10*3/MM3 (ref 0–0.2)
BASOPHILS NFR BLD AUTO: 0.3 % (ref 0–1.5)
BILIRUB SERPL-MCNC: 0.2 MG/DL (ref 0.2–1.3)
BUN BLD-MCNC: 7 MG/DL (ref 7–23)
BUN/CREAT SERPL: 9.5 (ref 7–25)
CALCIUM SPEC-SCNC: 9.2 MG/DL (ref 8.4–10.2)
CHLORIDE SERPL-SCNC: 107 MMOL/L (ref 101–112)
CO2 SERPL-SCNC: 28 MMOL/L (ref 22–30)
CREAT BLD-MCNC: 0.74 MG/DL (ref 0.52–1.04)
DEPRECATED RDW RBC AUTO: 41.7 FL (ref 37–54)
EOSINOPHIL # BLD AUTO: 0.13 10*3/MM3 (ref 0–0.4)
EOSINOPHIL NFR BLD AUTO: 1.7 % (ref 0.3–6.2)
ERYTHROCYTE [DISTWIDTH] IN BLOOD BY AUTOMATED COUNT: 13.3 % (ref 12.3–15.4)
ERYTHROCYTE [SEDIMENTATION RATE] IN BLOOD: 2 MM/HR (ref 0–20)
GFR SERPL CREATININE-BSD FRML MDRD: 96 ML/MIN/1.73 (ref 71–165)
GLOBULIN UR ELPH-MCNC: 2.8 GM/DL (ref 2.3–3.5)
GLUCOSE BLD-MCNC: 94 MG/DL (ref 70–99)
HCT VFR BLD AUTO: 41.6 % (ref 34–46.6)
HGB BLD-MCNC: 14.2 G/DL (ref 12–15.9)
LYMPHOCYTES # BLD AUTO: 1.38 10*3/MM3 (ref 0.7–3.1)
LYMPHOCYTES NFR BLD AUTO: 18.4 % (ref 19.6–45.3)
MCH RBC QN AUTO: 29.5 PG (ref 26.6–33)
MCHC RBC AUTO-ENTMCNC: 34.1 G/DL (ref 31.5–35.7)
MCV RBC AUTO: 86.3 FL (ref 79–97)
MONOCYTES # BLD AUTO: 0.79 10*3/MM3 (ref 0.1–0.9)
MONOCYTES NFR BLD AUTO: 10.6 % (ref 5–12)
NEUTROPHILS # BLD AUTO: 5.16 10*3/MM3 (ref 1.7–7)
NEUTROPHILS NFR BLD AUTO: 69 % (ref 42.7–76)
PLATELET # BLD AUTO: 270 10*3/MM3 (ref 140–450)
PMV BLD AUTO: 10.1 FL (ref 6–12)
POTASSIUM BLD-SCNC: 4 MMOL/L (ref 3.4–5)
PROT SERPL-MCNC: 7.1 G/DL (ref 6.3–8.6)
RBC # BLD AUTO: 4.82 10*6/MM3 (ref 3.77–5.28)
SODIUM BLD-SCNC: 142 MMOL/L (ref 137–145)
WBC NRBC COR # BLD: 7.48 10*3/MM3 (ref 3.4–10.8)

## 2020-05-18 PROCEDURE — 36415 COLL VENOUS BLD VENIPUNCTURE: CPT | Performed by: NURSE PRACTITIONER

## 2020-05-18 PROCEDURE — 80053 COMPREHEN METABOLIC PANEL: CPT | Performed by: NURSE PRACTITIONER

## 2020-05-18 PROCEDURE — 85651 RBC SED RATE NONAUTOMATED: CPT | Performed by: NURSE PRACTITIONER

## 2020-05-18 PROCEDURE — 85025 COMPLETE CBC W/AUTO DIFF WBC: CPT | Performed by: NURSE PRACTITIONER

## 2020-05-18 PROCEDURE — 83690 ASSAY OF LIPASE: CPT | Performed by: NURSE PRACTITIONER

## 2020-05-18 PROCEDURE — 86677 HELICOBACTER PYLORI ANTIBODY: CPT | Performed by: NURSE PRACTITIONER

## 2020-05-18 PROCEDURE — 82150 ASSAY OF AMYLASE: CPT | Performed by: NURSE PRACTITIONER

## 2020-05-19 ENCOUNTER — LAB (OUTPATIENT)
Dept: LAB | Facility: OTHER | Age: 25
End: 2020-05-19

## 2020-05-19 DIAGNOSIS — R10.84 ABDOMINAL PAIN, GENERALIZED: ICD-10-CM

## 2020-05-19 DIAGNOSIS — R19.7 DIARRHEA, UNSPECIFIED TYPE: ICD-10-CM

## 2020-05-19 LAB
HEMOCCULT STL QL: NEGATIVE
LIPASE SERPL-CCNC: 15 U/L (ref 13–60)

## 2020-05-19 PROCEDURE — 82270 OCCULT BLOOD FECES: CPT | Performed by: NURSE PRACTITIONER

## 2020-05-20 LAB
H PYLORI IGA SER IA-ACNC: <9 UNITS (ref 0–8.9)
H PYLORI IGG SER IA-ACNC: 0.27 INDEX VALUE (ref 0–0.79)

## 2020-05-27 ENCOUNTER — DOCUMENTATION (OUTPATIENT)
Dept: FAMILY MEDICINE CLINIC | Facility: CLINIC | Age: 25
End: 2020-05-27

## 2020-06-08 ENCOUNTER — HOSPITAL ENCOUNTER (OUTPATIENT)
Dept: NUCLEAR MEDICINE | Facility: HOSPITAL | Age: 25
Discharge: HOME | End: 2020-06-08

## 2020-06-08 DIAGNOSIS — R19.7 DIARRHEA, UNSPECIFIED TYPE: ICD-10-CM

## 2020-06-08 DIAGNOSIS — R10.84 ABDOMINAL PAIN, GENERALIZED: ICD-10-CM

## 2020-06-08 PROCEDURE — A9500 TC99M SESTAMIBI: HCPCS | Performed by: NURSE PRACTITIONER

## 2020-06-08 PROCEDURE — 0 TECHNETIUM SESTAMIBI: Performed by: NURSE PRACTITIONER

## 2020-06-08 PROCEDURE — 78226 HEPATOBILIARY SYSTEM IMAGING: CPT

## 2020-06-08 RX ADMIN — TECHNETIUM TC 99M SESTAMIBI 1 DOSE: 1 INJECTION INTRAVENOUS at 09:03

## 2020-06-12 ENCOUNTER — OFFICE VISIT (OUTPATIENT)
Dept: FAMILY MEDICINE CLINIC | Facility: CLINIC | Age: 25
End: 2020-06-12

## 2020-06-12 DIAGNOSIS — R10.84 ABDOMINAL PAIN, GENERALIZED: Primary | ICD-10-CM

## 2020-06-12 DIAGNOSIS — R19.7 DIARRHEA, UNSPECIFIED TYPE: ICD-10-CM

## 2020-06-12 PROCEDURE — 99442 PR PHYS/QHP TELEPHONE EVALUATION 11-20 MIN: CPT | Performed by: NURSE PRACTITIONER

## 2020-06-12 RX ORDER — OMEPRAZOLE 40 MG/1
40 CAPSULE, DELAYED RELEASE ORAL DAILY
Qty: 30 CAPSULE | Refills: 2 | Status: SHIPPED | OUTPATIENT
Start: 2020-06-12 | End: 2021-03-10

## 2020-06-12 RX ORDER — SUCRALFATE 1 G/1
1 TABLET ORAL 3 TIMES DAILY PRN
Qty: 90 TABLET | Refills: 2 | Status: SHIPPED | OUTPATIENT
Start: 2020-06-12 | End: 2021-03-10

## 2020-06-12 RX ORDER — DICYCLOMINE HCL 20 MG
TABLET ORAL
Qty: 90 TABLET | Refills: 2 | Status: SHIPPED | OUTPATIENT
Start: 2020-06-12 | End: 2021-03-10

## 2020-06-12 NOTE — PROGRESS NOTES
Subjective   Tania Vital is a 24 y.o. female who presents via telephone visit due to covid-19 for f/u on abd pain and diarrhea.     History of Present Illness     You have chosen to receive care through a telephone visit. Do you consent to use a telephone visit for your medical care today? Yes    Telephone visit lasted 13 mins.     Stomach pain/diarrhea-acute, new problem, improving  -5/11/20: x two weeks and now mucus like. Usually not this bad with IBS. Going 6-7x/days, sometimes doesn't have to eat to flare it up. Usually with IBS, it is only if she eats. Pain in stomach before ibs as well, stomach has been more irritated past few weeks but not sure why. Some relief of abd pain with defecation but only lasts 30mins or so. abd pain is sharp pain and all over and having some pain near ribs on right side and that just started yesterday. Diarrhea at first but consistency still different. Food does not make a difference on pain/diarrhea.   -off bentyl and probiotics at this time. Currently only taking daily vitamin and nasal allergy spray daily, off junel. Prefer meds first then work up, states hx of GB disease in family. POC: omeprazole daily, bentyl and carafate prn. Labs (amylase/lipase/cbc/cmp/sed rate/hpylori and maybe stool specimen or gi panel) and u/s GB if not resolving, pt will call for these and refills if needed. Suggested to f/u if blood in stool. Drink plenty of water to stay hydrated. Drink 1-2 gatorades daily to replace electrolytes lost with diarrhea. F/u regardless if not mproving.   -6/12/20: pt states carafate helps and omeprazole 40mg. She is taking the omeprazole daily and carafate as needed. States she has not been having diarrhea x two days. Bentyl helped with diarrhea.   -poC; with flarie up, ct abd pelvis contrast in future. Possible referral to gi in the future to be considered, pt will call for this. Dicyclomine and carafate prn. Omeprazole daily.     F/u prn.    ___________________________________    Past Medical History:  IBS with diarrhea-chronic, with rare intermittent episodes.  -currently not taking with Bentyl and probiotic  -Patient states when she was younger she had a lot of pain with eating, did come down since then but she usually has a problem once a month with one meal.  Describes as sharp crampy abdominal pain generalized with diarrhea, usually only a couple of episodes of diarrhea and then it resolves, states mucus in the diarrhea at times.  Has not tried anything for it.  -7/23/2019: Patient states that her mom told her to start on antibiotic which she started about 2 weeks ago taking Bentyl as needed and has had no trouble.  We will follow-up if issues.  -5/11/20: pt states not the same thing as ibs falre up like what she is dcurrently having, see note below.   Fatigue- acute, improving over time.  -Lab work-up done and within normal limi  Irregular painful menstrual cycle/prolonged vaginal bleeding.-acute, improving on Janel 1/20  -At last visit: Patient states that she has been having really bad cramps in her menstrual cycle for the past 17 days, she states that this is very abnormal for her to have at this long, usually she has a heavy.  Normally patient has heavy to mild periods that last about 3 days, cramping is always bad and they usually occur at the first of the month regularly.  This the first time she has had this along with her menstrual cycle.  Patient states only changes that she can recall is that she does have an increased amount of stress.  Patient is not on birth control.  Plan of care: Collaborated with OB/GYN Carrie Oconnell, will start patient on birth control pills, patient will follow-up if bleeding does not stop in the next week, ordered lab work to assess for anemia.  Follow-up in 3 months.  -7/23/2019: Patient states she is having regular menstrual cycles and no longer prolonged.  States doing well on birth  control.  Allergic rhinitis-chronic, controlled with Nasacort.  -Recurrent URIs/bad sinus issues    Family history:  -Mom-breast cancer at age 51 and thyroid issues, high cholesterol,, hypertension  -Grandma on dad side-MI at old age  -Grandma on mom's side-MI at old age  -History of weak starts in the family per patient  -Uncle and aunt-pacemaker  -Dad-hypertension, hyperlipidemia  -fam hx of GB disease    The following portions of the patient's history were reviewed and updated as appropriate: allergies, current medications, past family history, past medical history, past social history, past surgical history and problem list.    Review of Systems   Constitutional: Positive for appetite change and fatigue (Improving). Negative for activity change, chills, diaphoresis, fever and unexpected weight change.   HENT: Negative for congestion, ear discharge, ear pain, nosebleeds, postnasal drip, rhinorrhea, sinus pressure, sinus pain, sneezing, sore throat, tinnitus and trouble swallowing.    Eyes: Negative.    Respiratory: Negative for cough, chest tightness, shortness of breath and wheezing.    Cardiovascular: Negative for chest pain, palpitations and leg swelling.   Gastrointestinal: Negative for abdominal distention, abdominal pain, blood in stool, constipation, diarrhea, nausea and vomiting.   Genitourinary: Negative for difficulty urinating, dyspareunia, dysuria, flank pain, frequency, hematuria, menstrual problem, vaginal bleeding, vaginal discharge and vaginal pain.   Musculoskeletal: Negative for arthralgias, back pain, gait problem, joint swelling and myalgias.   Skin: Negative for rash and wound.   Allergic/Immunologic: Negative for environmental allergies, food allergies and immunocompromised state.   Neurological: Negative for dizziness, tremors, seizures, syncope, weakness, light-headedness, numbness and headaches.   Hematological: Does not bruise/bleed easily.   Psychiatric/Behavioral: Negative for  behavioral problems, self-injury, sleep disturbance and suicidal ideas. The patient is nervous/anxious (Improving).          Objective   There were no vitals taken for this visit.Unable to complete physical exam due to telephone visit, pt is alert and oriented to person, she is pleasant and cooperative and tone of voice is consistent with previous visits in the past.       Assessment/Plan   Diagnoses and all orders for this visit:    Abdominal pain, generalized  -     omeprazole (priLOSEC) 40 MG capsule; Take 1 capsule by mouth Daily.  -     sucralfate (CARAFATE) 1 g tablet; Take 1 tablet by mouth 3 (Three) Times a Day As Needed (stomach pain). Separate from vitamin by two hrs.  -     dicyclomine (Bentyl) 20 MG tablet; Take 1/2-1tab tid prn for diarrhea/abd pain.    Diarrhea, unspecified type  -     omeprazole (priLOSEC) 40 MG capsule; Take 1 capsule by mouth Daily.  -     sucralfate (CARAFATE) 1 g tablet; Take 1 tablet by mouth 3 (Three) Times a Day As Needed (stomach pain). Separate from vitamin by two hrs.  -     dicyclomine (Bentyl) 20 MG tablet; Take 1/2-1tab tid prn for diarrhea/abd pain.            Plan of Care:    Please See History of Present Illness(HPI) above for Plan of Care (POC) for individualized diagnoses as well as when to follow up.       Patient educated to follow-up sooner than next scheduled appointment if condition(s) worse or do not improve. Patient states understanding and is in agreeance with plan of care. An After Visit Summary was printed and given to the patient.      LELIA Galvin        This document has been electronically signed by LELIA Galvin on June 29, 2020 01:39      EMR/Transcription Dragon Disclaimer:  Some of this note may be an electronic dragon transcription/translation of spoken language to printed text. The electronic translation of spoken language may permit erroneous, or at times, nonsensical words or phrases to be inadvertently transcribed. Although  I have reviewed the note for such errors, some may still exist.

## 2021-01-27 ENCOUNTER — OFFICE VISIT (OUTPATIENT)
Dept: SURGERY | Facility: CLINIC | Age: 26
End: 2021-01-27

## 2021-01-27 VITALS
BODY MASS INDEX: 29.15 KG/M2 | TEMPERATURE: 99.3 F | WEIGHT: 181.4 LBS | DIASTOLIC BLOOD PRESSURE: 82 MMHG | SYSTOLIC BLOOD PRESSURE: 118 MMHG | HEIGHT: 66 IN | HEART RATE: 72 BPM

## 2021-01-27 DIAGNOSIS — R10.30 LOWER ABDOMINAL PAIN: Primary | ICD-10-CM

## 2021-01-27 PROCEDURE — 99213 OFFICE O/P EST LOW 20 MIN: CPT | Performed by: NURSE PRACTITIONER

## 2021-01-27 RX ORDER — CETIRIZINE HYDROCHLORIDE 10 MG/1
10 TABLET ORAL DAILY
COMMUNITY
End: 2021-09-08

## 2021-01-27 NOTE — PROGRESS NOTES
Subjective   Tania Vital is a 25 y.o. female     Chief Complaint: Abdominal pain.    Ms. Vital has had cramping type generalized abdominal pain for at least 10 years but states it is becoming more frequent and bothersome.  Has tried multiple medications with no success.  Denies relation to food consumption or bowel movements. Does have diarrhea chronically that she describes mucous-like.  She denies blood in her stool and denies family  history of colon cancer.  She has had HIDA  In May 2020 which showed 99% gallbladder EF and no documentation of subjective symptoms.        Abdominal Pain  This is a chronic problem. The current episode started more than 1 year ago. The problem occurs intermittently. The problem has been unchanged. The pain is located in the generalized abdominal region and RLQ. The pain is at a severity of 4/10. The pain is moderate. The quality of the pain is cramping. The abdominal pain does not radiate. Associated symptoms include diarrhea and nausea. Pertinent negatives include no constipation, dysuria, fever or vomiting. Nothing aggravates the pain. The pain is relieved by nothing. She has tried proton pump inhibitors (Bentyl, carafate, prilosec) for the symptoms. The treatment provided no relief. Prior diagnostic workup includes ultrasound.        Review of Systems   Constitutional: Negative.  Negative for chills and fever.   HENT: Negative.    Eyes: Negative.    Respiratory: Negative.  Negative for shortness of breath.    Cardiovascular: Negative.    Gastrointestinal: Positive for abdominal pain, diarrhea and nausea. Negative for abdominal distention, blood in stool, constipation and vomiting.   Endocrine: Negative.    Genitourinary: Negative.  Negative for difficulty urinating, dysuria and menstrual problem.   Musculoskeletal: Negative.    Skin: Negative for color change, pallor and rash.   Allergic/Immunologic: Negative.    Neurological: Negative.  Negative for light-headedness.    Hematological: Negative.      Past Medical History:   Diagnosis Date   • Allergic    • Allergic rhinitis    • Diarrhea    • Headache    • Otalgia of right ear     due to pustule      • Visual impairment      History reviewed. No pertinent surgical history.  Family History   Problem Relation Age of Onset   • Cancer Mother    • Hypertension Mother    • Thyroid disease Mother    • Hypertension Father    • Heart disease Maternal Grandfather    • Heart disease Paternal Grandfather      Social History     Socioeconomic History   • Marital status: Single     Spouse name: Not on file   • Number of children: Not on file   • Years of education: Not on file   • Highest education level: Not on file   Tobacco Use   • Smoking status: Never Smoker   • Smokeless tobacco: Never Used   • Tobacco comment: No smokers in the household.   Substance and Sexual Activity   • Alcohol use: No   • Drug use: No     Allergies   Allergen Reactions   • Penicillins Rash   • Other      Vantyn     Vitals:    01/27/21 0914   BP: 118/82   Pulse: 72   Temp: 99.3 °F (37.4 °C)       Home Medications:  Prior to Admission medications    Medication Sig Start Date End Date Taking? Authorizing Provider   cetirizine (zyrTEC) 10 MG tablet Take 10 mg by mouth Daily.   Yes ProviderGeena MD   Unable to find 1 each 1 (One) Time. Med Name: Nasacort nasal spray every morning   Yes ProviderGeena MD   dicyclomine (Bentyl) 20 MG tablet Take 1/2-1tab tid prn for diarrhea/abd pain. 6/12/20   Angelita Jimenez APRN   omeprazole (priLOSEC) 40 MG capsule Take 1 capsule by mouth Daily. 6/12/20   Angelita Jimenez APRN   sucralfate (CARAFATE) 1 g tablet Take 1 tablet by mouth 3 (Three) Times a Day As Needed (stomach pain). Separate from vitamin by two hrs. 6/12/20   Angelita Jimenez APRN       Objective   Physical Exam  Vitals signs reviewed.   Constitutional:       Appearance: Normal appearance.   HENT:      Head: Normocephalic and atraumatic.    Cardiovascular:      Rate and Rhythm: Normal rate.   Pulmonary:      Effort: Pulmonary effort is normal. No respiratory distress.   Abdominal:      General: Abdomen is flat. Bowel sounds are normal. There is no distension. There are no signs of injury.      Palpations: Abdomen is soft. There is no mass.      Tenderness: There is abdominal tenderness in the right lower quadrant. There is no guarding or rebound. Negative signs include McBurney's sign.      Hernia: No hernia is present.   Skin:     General: Skin is warm and dry.   Neurological:      General: No focal deficit present.      Mental Status: She is alert and oriented to person, place, and time.   Psychiatric:         Mood and Affect: Mood normal.         Behavior: Behavior normal.         Thought Content: Thought content normal.         Judgment: Judgment normal.         Assessment/Plan      The encounter diagnosis was Lower abdominal pain.  1. Abdominal pain- Obtain CT imaging and will follow up in one week. Recommended colonoscopy but she is not agreeable at this time.  Patient to complete GI distress panel at her convenience.  She is instructed to return to office or seek further medical treatment if symptoms worsen prior to follow up.         Patient's Body mass index is 29.28 kg/m². BMI is above normal parameters. Recommendations include: educational material.              This document has been electronically signed by LELIA Buckley on January 27, 2021 14:58 CST

## 2021-01-27 NOTE — PATIENT INSTRUCTIONS
"BMI for Adults  What is BMI?  Body mass index (BMI) is a number that is calculated from a person's weight and height. BMI can help estimate how much of a person's weight is composed of fat. BMI does not measure body fat directly. Rather, it is an alternative to procedures that directly measure body fat, which can be difficult and expensive.  BMI can help identify people who may be at higher risk for certain medical problems.  What are BMI measurements used for?  BMI is used as a screening tool to identify possible weight problems. It helps determine whether a person is obese, overweight, a healthy weight, or underweight.  BMI is useful for:  · Identifying a weight problem that may be related to a medical condition or may increase the risk for medical problems.  · Promoting changes, such as changes in diet and exercise, to help reach a healthy weight. BMI screening can be repeated to see if these changes are working.  How is BMI calculated?  BMI involves measuring your weight in relation to your height. Both height and weight are measured, and the BMI is calculated from those numbers. This can be done either in English ([place] or metric measurements. Note that charts and online BMI calculators are available to help you find your BMI quickly and easily without having to do these calculations yourself.  To calculate your BMI in English ([place] measurements:    1. Measure your weight in pounds (lb).  2. Multiply the number of pounds by 703.  ? For example, for a person who weighs 180 lb, multiply that number by 703, which equals 126,540.  3. Measure your height in inches. Then multiply that number by itself to get a measurement called \"inches squared.\"  ? For example, for a person who is 70 inches tall, the \"inches squared\" measurement is 70 inches x 70 inches, which equals 4,900 inches squared.  4. Divide the total from step 2 (number of lb x 703) by the total from step 3 (inches squared): 126,540 ÷ 4,900 = 25.8. This is " "your BMI.  To calculate your BMI in metric measurements:  1. Measure your weight in kilograms (kg).  2. Measure your height in meters (m). Then multiply that number by itself to get a measurement called \"meters squared.\"  ? For example, for a person who is 1.75 m tall, the \"meters squared\" measurement is 1.75 m x 1.75 m, which is equal to 3.1 meters squared.  3. Divide the number of kilograms (your weight) by the meters squared number. In this example: 70 ÷ 3.1 = 22.6. This is your BMI.  What do the results mean?  BMI charts are used to identify whether you are underweight, normal weight, overweight, or obese. The following guidelines will be used:  · Underweight: BMI less than 18.5.  · Normal weight: BMI between 18.5 and 24.9.  · Overweight: BMI between 25 and 29.9.  · Obese: BMI of 30 or above.  Keep these notes in mind:  · Weight includes both fat and muscle, so someone with a muscular build, such as an athlete, may have a BMI that is higher than 24.9. In cases like these, BMI is not an accurate measure of body fat.  · To determine if excess body fat is the cause of a BMI of 25 or higher, further assessments may need to be done by a health care provider.  · BMI is usually interpreted in the same way for men and women.  Where to find more information  For more information about BMI, including tools to quickly calculate your BMI, go to these websites:  · Centers for Disease Control and Prevention: www.cdc.gov  · American Heart Association: www.heart.org  · National Heart, Lung, and Blood [place]: www.nhlbi.nih.gov  Summary  · Body mass index (BMI) is a number that is calculated from a person's weight and height.  · BMI may help estimate how much of a person's weight is composed of fat. BMI can help identify those who may be at higher risk for certain medical problems.  · BMI can be measured using English measurements or metric measurements.  · BMI charts are used to identify whether you are underweight, normal " weight, overweight, or obese.  This information is not intended to replace advice given to you by your health care provider. Make sure you discuss any questions you have with your health care provider.  Document Revised: 09/09/2020 Document Reviewed: 07/17/2020  Elsevier Patient Education © 2020 [employer]

## 2021-02-05 DIAGNOSIS — R10.30 LOWER ABDOMINAL PAIN: Primary | ICD-10-CM

## 2021-02-08 ENCOUNTER — LAB (OUTPATIENT)
Dept: LAB | Facility: OTHER | Age: 26
End: 2021-02-08

## 2021-02-08 ENCOUNTER — HOSPITAL ENCOUNTER (OUTPATIENT)
Dept: CT IMAGING | Facility: HOSPITAL | Age: 26
Discharge: HOME | End: 2021-02-08
Admitting: NURSE PRACTITIONER

## 2021-02-08 DIAGNOSIS — R10.30 LOWER ABDOMINAL PAIN: ICD-10-CM

## 2021-02-08 PROCEDURE — 83516 IMMUNOASSAY NONANTIBODY: CPT | Performed by: NURSE PRACTITIONER

## 2021-02-08 PROCEDURE — 25010000002 IOPAMIDOL 61 % SOLUTION: Performed by: NURSE PRACTITIONER

## 2021-02-08 PROCEDURE — 86003 ALLG SPEC IGE CRUDE XTRC EA: CPT | Performed by: NURSE PRACTITIONER

## 2021-02-08 PROCEDURE — 74177 CT ABD & PELVIS W/CONTRAST: CPT

## 2021-02-08 PROCEDURE — 36415 COLL VENOUS BLD VENIPUNCTURE: CPT | Performed by: NURSE PRACTITIONER

## 2021-02-08 RX ADMIN — IOPAMIDOL 90 ML: 612 INJECTION, SOLUTION INTRAVENOUS at 15:09

## 2021-02-09 LAB
GLIADIN PEPTIDE IGA SER-ACNC: 3 UNITS (ref 0–19)
GLIADIN PEPTIDE IGG SER-ACNC: 3 UNITS (ref 0–19)
TTG IGA SER-ACNC: <2 U/ML (ref 0–3)
TTG IGG SER-ACNC: 3 U/ML (ref 0–5)

## 2021-02-10 LAB
CODFISH IGE QN: <0.1 KU/L
CONV CLASS DESCRIPTION: ABNORMAL
COW MILK IGE QN: <0.1 KU/L
EGG WHITE IGE QN: 0.12 KU/L
GLUTEN IGE QN: <0.1 KU/L
HAZELNUT IGE QN: <0.1 KU/L
PEANUT IGE QN: <0.1 KU/L
SCALLOP IGE QN: <0.1 KU/L
SESAME SEED IGE QN: <0.1 KU/L
SHRIMP IGE QN: <0.1 KU/L
SOYBEAN IGE QN: <0.1 KU/L
WALNUT IGE QN: <0.1 KU/L
WHEAT IGE QN: <0.1 KU/L

## 2021-02-15 ENCOUNTER — OFFICE VISIT (OUTPATIENT)
Dept: SURGERY | Facility: CLINIC | Age: 26
End: 2021-02-15

## 2021-02-15 DIAGNOSIS — R10.30 LOWER ABDOMINAL PAIN: Primary | ICD-10-CM

## 2021-02-15 PROCEDURE — 99441 PR PHYS/QHP TELEPHONE EVALUATION 5-10 MIN: CPT | Performed by: NURSE PRACTITIONER

## 2021-02-15 NOTE — PROGRESS NOTES
"You have chosen to receive care through a telephone visit. Do you consent to use a telephone visit for your medical care today? Yes      Chief Complaint  Follow-up (abdominal pain, CT results)    Subjective          Tania Vital presents to [facility] GENERAL SURGERY  History of Present Illness  Ms. Vital presents for follow up to review CT results.  She presented several weeks ago with chronic intermittent abdominal pain. She describes as unrelated to food consumption. She states since her last visit she has not had any \"flare ups\"  of abdominal pain. She denies any constipation. She does have chronic mucous like diarrhea.  She denies blood in stool or nausea.  Denies family history of colon cancer. She also had negative HIDA scan in 2020.       Review of Systems   Constitutional: Negative for chills, fatigue and fever.   Respiratory: Negative for shortness of breath.    Gastrointestinal: Positive for diarrhea. Negative for abdominal distention, abdominal pain, blood in stool, constipation, nausea and vomiting.     Objective   Vital Signs:   There were no vitals taken for this visit.    Physical Exam  Vitals signs reviewed.   Neurological:      General: No focal deficit present.      Mental Status: She is oriented to person, place, and time.   Psychiatric:         Thought Content: Thought content normal.      Unable to perform physical exam due to   Result Review :       Data reviewed: Radiologic studies CT abdomen and pelvis and GI studies GI recurrent distress panel        CT results unremarkable, recurrent GI distress panel resulted as unremarkable with egg white very minimally elevated.       Assessment and Plan    Diagnoses and all orders for this visit:    1. Lower abdominal pain (Primary)  -     Ambulatory Referral to Gastroenterology    Referral placed to GI for follow up.  She would not like to proceed with EGD/colonscopy at this time.          Follow Up   Return if symptoms " worsen or fail to improve.     Patient was given instructions and counseling regarding her condition or for health maintenance advice. Please see specific information pulled into the AVS if appropriate.                 This document has been electronically signed by LELIA Buckley on February 15, 2021 11:12 CST       This visit has been rescheduled as a phone visit to comply with patient safety concerns in accordance with CDC recommendations. Total time of discussion was 5 minutes.

## 2021-02-22 ENCOUNTER — OFFICE VISIT (OUTPATIENT)
Dept: GASTROENTEROLOGY | Facility: CLINIC | Age: 26
End: 2021-02-22

## 2021-02-22 VITALS
HEART RATE: 84 BPM | SYSTOLIC BLOOD PRESSURE: 135 MMHG | WEIGHT: 183.8 LBS | DIASTOLIC BLOOD PRESSURE: 81 MMHG | BODY MASS INDEX: 29.54 KG/M2 | HEIGHT: 66 IN

## 2021-02-22 DIAGNOSIS — R19.7 DIARRHEA, UNSPECIFIED TYPE: ICD-10-CM

## 2021-02-22 DIAGNOSIS — R10.84 GENERALIZED ABDOMINAL PAIN: Primary | ICD-10-CM

## 2021-02-22 DIAGNOSIS — R19.5 MUCUS IN STOOL: ICD-10-CM

## 2021-02-22 DIAGNOSIS — R19.8 ABNORMAL BOWEL HABITS: ICD-10-CM

## 2021-02-22 PROCEDURE — 99214 OFFICE O/P EST MOD 30 MIN: CPT | Performed by: NURSE PRACTITIONER

## 2021-02-22 RX ORDER — DEXTROSE AND SODIUM CHLORIDE 5; .45 G/100ML; G/100ML
30 INJECTION, SOLUTION INTRAVENOUS CONTINUOUS PRN
Status: CANCELLED | OUTPATIENT
Start: 2021-03-15

## 2021-02-22 NOTE — PROGRESS NOTES
"Chief Complaint   Patient presents with   • Abdominal Pain   • Diarrhea       Subjective    Tania Vital is a 25 y.o. female. she is being seen for consultation today at the request of LELIA Trevino     25-year-old female presents to discuss abdominal pain diarrhea and mucus within her stool.  Reports symptoms have occurred intermittently over the last few years but most severe episode was last May.  Symptoms recently restarted she has had normal ultrasound, HIDA scan at 99% and recent CT which showed no acute abnormality.    Abdominal Pain  This is a chronic problem. The current episode started more than 1 year ago. The onset quality is gradual. The problem occurs intermittently. The pain is located in the periumbilical region. The pain is moderate. The quality of the pain is sharp (\"feels like pin prick all over abdomen\"). Associated symptoms include belching (bloating ), diarrhea, flatus, frequency and nausea. Pertinent negatives include no anorexia, arthralgias, constipation, dysuria, fever, headaches, hematochezia, hematuria, melena, myalgias, vomiting or weight loss. Nothing (occurs with and without intake ) aggravates the pain. She has tried antacids, H2 blockers and proton pump inhibitors for the symptoms. The treatment provided mild relief. Prior diagnostic workup includes CT scan and ultrasound (HIDA ).   Diarrhea   This is a chronic problem. The current episode started more than 1 year ago. The problem occurs 2 to 4 times per day (4-5 times per day ). The problem has been waxing and waning. The stool consistency is described as mucous. The patient states that diarrhea awakens her from sleep. Associated symptoms include abdominal pain, bloating and increased flatus. Pertinent negatives include no arthralgias, chills, coughing, fever, headaches, myalgias, sweats, URI, vomiting or weight loss. She has tried anti-motility drug, increased fluids and change of diet for the symptoms.     Plan; " schedule patient for EGD and colonoscopy and concern for inflammatory bowel disease.  Continue PPI daily dicyclomine.  Consider Lomotil pending results of the above.       The following portions of the patient's history were reviewed and updated as appropriate:   Past Medical History:   Diagnosis Date   • Allergic    • Allergic rhinitis    • Diarrhea    • Headache    • Otalgia of right ear     due to pustule      • Visual impairment      No past surgical history on file.  Family History   Problem Relation Age of Onset   • Cancer Mother    • Hypertension Mother    • Thyroid disease Mother    • Hypertension Father    • Heart disease Maternal Grandfather    • Heart disease Paternal Grandfather      OB History    No obstetric history on file.       Prior to Admission medications    Medication Sig Start Date End Date Taking? Authorizing Provider   cetirizine (zyrTEC) 10 MG tablet Take 10 mg by mouth Daily.   Yes Geena Solano MD   dicyclomine (Bentyl) 20 MG tablet Take 1/2-1tab tid prn for diarrhea/abd pain. 6/12/20  Yes Angelita Jimenez APRN   sucralfate (CARAFATE) 1 g tablet Take 1 tablet by mouth 3 (Three) Times a Day As Needed (stomach pain). Separate from vitamin by two hrs. 6/12/20  Yes Angelita Jimenez APRN   Unable to find 1 each 1 (One) Time. Med Name: Nasacort nasal spray every morning   Yes ProviderGeena MD   omeprazole (priLOSEC) 40 MG capsule Take 1 capsule by mouth Daily. 6/12/20   Angelita Jimenez APRN     Allergies   Allergen Reactions   • Penicillins Rash   • Other      Vantyn     Social History     Socioeconomic History   • Marital status: Single     Spouse name: Not on file   • Number of children: Not on file   • Years of education: Not on file   • Highest education level: Not on file   Tobacco Use   • Smoking status: Never Smoker   • Smokeless tobacco: Never Used   • Tobacco comment: No smokers in the household.   Substance and Sexual Activity   • Alcohol use: No   • Drug use:  "No       Review of Systems  Review of Systems   Constitutional: Negative for chills, fever and weight loss.   Respiratory: Negative for cough.    Gastrointestinal: Positive for abdominal pain, bloating, diarrhea, flatus and nausea. Negative for anorexia, constipation, hematochezia, melena and vomiting.   Genitourinary: Positive for frequency. Negative for dysuria and hematuria.   Musculoskeletal: Negative for arthralgias and myalgias.   Neurological: Negative for headaches.        /81 (BP Location: Left arm)   Pulse 84   Ht 167.6 cm (66\")   Wt 83.4 kg (183 lb 12.8 oz)   BMI 29.67 kg/m²     Objective    Physical Exam  Constitutional:       General: She is not in acute distress.     Appearance: Normal appearance. She is well-developed.   HENT:      Head: Normocephalic and atraumatic.   Neck:      Musculoskeletal: Normal range of motion and neck supple.      Thyroid: No thyromegaly.   Pulmonary:      Effort: Pulmonary effort is normal.   Abdominal:      General: Bowel sounds are normal. There is no distension.      Palpations: Abdomen is soft. Abdomen is not rigid.      Tenderness: There is generalized abdominal tenderness and tenderness in the right lower quadrant, epigastric area and left upper quadrant. There is no guarding.      Hernia: No hernia is present.   Skin:     General: Skin is warm and dry.      Coloration: Skin is not pale.      Findings: No rash.   Neurological:      Mental Status: She is alert and oriented to person, place, and time.   Psychiatric:         Speech: Speech normal.         Behavior: Behavior is cooperative.       Lab on 02/08/2021   Component Date Value Ref Range Status   • Class Description 02/08/2021 Comment   Final        Levels of Specific IgE       Class  Description of Class      ---------------------------  -----  --------------------                     < 0.10         0         Negative             0.10 -    0.31         0/I       Equivocal/Low             0.32 -    0.55 "         I         Low             0.56 -    1.40         II        Moderate             1.41 -    3.90         III       High             3.91 -   19.00         IV        Very High            19.01 -  100.00         V         Very High                    >100.00         VI        Very High   • Egg White 02/08/2021 0.12* Class 0/I kU/L Final   • Milk, Cow's 02/08/2021 <0.10  Class 0 kU/L Final   • CodFish 02/08/2021 <0.10  Class 0 kU/L Final   • Sesame Seed 02/08/2021 <0.10  Class 0 kU/L Final   • Peanut 02/08/2021 <0.10  Class 0 kU/L Final   • Soybean 02/08/2021 <0.10  Class 0 kU/L Final   • Hazelnut 02/08/2021 <0.10  Class 0 kU/L Final   • Shrimp 02/08/2021 <0.10  Class 0 kU/L Final   • Scallop 02/08/2021 <0.10  Class 0 kU/L Final   • Gluten 02/08/2021 <0.10  Class 0 kU/L Final   • [place] 02/08/2021 <0.10  Class 0 kU/L Final   • Wheat 02/08/2021 <0.10  Class 0 kU/L Final   • Gliadin Deamidated Peptide Ab, IgA 02/08/2021 3  0 - 19 units Final                       Negative                   0 - 19                     Weak Positive             20 - 30                     Moderate to Strong Positive   >30   • Deaminated Gliadin Ab IgG 02/08/2021 3  0 - 19 units Final                       Negative                   0 - 19                     Weak Positive             20 - 30                     Moderate to Strong Positive   >30   • Tissue Transglutaminase IgA 02/08/2021 <2  0 - 3 U/mL Final                                  Negative        0 -  3                                Weak Positive   4 - 10                                Positive           >10   Tissue Transglutaminase (tTG) has been identified   as the endomysial antigen.  Studies have demonstr-   ated that endomysial IgA antibodies have over 99%   specificity for gluten sensitive enteropathy.   • Tissue Transglutaminase IgG 02/08/2021 3  0 - 5 U/mL Final                                  Negative        0 - 5                                Weak Positive   6 -  9                                Positive           >9     Assessment/Plan      1. Generalized abdominal pain    2. Abnormal bowel habits    3. Diarrhea, unspecified type    4. Mucus in stool    .       Orders placed during this encounter include:  Orders Placed This Encounter   Procedures   • Follow Anesthesia Guidelines / Standing Orders     Standing Status:   Future   • Obtain Informed Consent     Standing Status:   Future     Order Specific Question:   Informed Consent Given For     Answer:   ESOPHAGOGASTRODUODENOSCOPY and Colonoscopy       ESOPHAGOGASTRODUODENOSCOPY Monday (N/A), COLONOSCOPY (N/A)    Review and/or summary of lab tests, radiology, procedures, medications. Review and summary of old records and obtaining of history. The risks and benefits of my recommendations, as well as other treatment options were discussed with the patient today. Questions were answered.    New Medications Ordered This Visit   Medications   • Sod Picosulfate-Mag Ox-Cit Acd 10-3.5-12 MG-GM -GM/160ML solution     Sig: Take 1 bottle by mouth 1 (One) Time for 1 dose. See admin instructions     Dispense:  160 mL     Refill:  0       Follow-up: Return in about 4 weeks (around 3/22/2021) for Recheck, After test.          This document has been electronically signed by LELIA Boogie on February 22, 2021 10:54 CST           I spent 35 minutes caring for Tania on this date of service. This time includes time spent by me in the following activities:preparing for the visit, reviewing tests, obtaining and/or reviewing a separately obtained history, performing a medically appropriate examination and/or evaluation , counseling and educating the patient/family/caregiver, ordering medications, tests, or procedures, referring and communicating with other health care professionals , documenting information in the medical record and care coordination    Results for orders placed or performed in visit on 02/08/21   Glia(IgA / G) & TTG(IgA /  G)    Specimen: Blood   Result Value Ref Range    Gliadin Deamidated Peptide Ab, IgA 3 0 - 19 units    Deaminated Gliadin Ab IgG 3 0 - 19 units    Tissue Transglutaminase IgA <2 0 - 3 U/mL    Tissue Transglutaminase IgG 3 0 - 5 U/mL   Allergens (12) Foods    Specimen: Blood   Result Value Ref Range    Class Description Comment     Egg White 0.12 (A) Class 0/I kU/L    Milk, Cow's <0.10 Class 0 kU/L    CodFish <0.10 Class 0 kU/L    Sesame Seed <0.10 Class 0 kU/L    Peanut <0.10 Class 0 kU/L    Soybean <0.10 Class 0 kU/L    Hazelnut <0.10 Class 0 kU/L    Shrimp <0.10 Class 0 kU/L    Scallop <0.10 Class 0 kU/L    Gluten <0.10 Class 0 kU/L    [place] <0.10 Class 0 kU/L    Wheat <0.10 Class 0 kU/L   Results for orders placed or performed in visit on 05/19/20   Occult Blood X 1, Stool - Stool, Per Rectum    Specimen: Per Rectum; Stool   Result Value Ref Range    Fecal Occult Blood Negative Negative   Results for orders placed or performed in visit on 05/18/20   H.pylori,IgG / IgA Antibodies    Specimen: Blood   Result Value Ref Range    H. pylori, IgA ABS <9.0 0.0 - 8.9 units    H. pylori IgG 0.27 0.00 - 0.79 Index Value   CBC Auto Differential    Specimen: Blood   Result Value Ref Range    WBC 7.48 3.40 - 10.80 10*3/mm3    RBC 4.82 3.77 - 5.28 10*6/mm3    Hemoglobin 14.2 12.0 - 15.9 g/dL    Hematocrit 41.6 34.0 - 46.6 %    MCV 86.3 79.0 - 97.0 fL    MCH 29.5 26.6 - 33.0 pg    MCHC 34.1 31.5 - 35.7 g/dL    RDW 13.3 12.3 - 15.4 %    RDW-SD 41.7 37.0 - 54.0 fl    MPV 10.1 6.0 - 12.0 fL    Platelets 270 140 - 450 10*3/mm3    Neutrophil % 69.0 42.7 - 76.0 %    Lymphocyte % 18.4 (L) 19.6 - 45.3 %    Monocyte % 10.6 5.0 - 12.0 %    Eosinophil % 1.7 0.3 - 6.2 %    Basophil % 0.3 0.0 - 1.5 %    Neutrophils, Absolute 5.16 1.70 - 7.00 10*3/mm3    Lymphocytes, Absolute 1.38 0.70 - 3.10 10*3/mm3    Monocytes, Absolute 0.79 0.10 - 0.90 10*3/mm3    Eosinophils, Absolute 0.13 0.00 - 0.40 10*3/mm3    Basophils, Absolute 0.02 0.00 - 0.20  10*3/mm3   Sedimentation Rate    Specimen: Blood   Result Value Ref Range    Sed Rate 2 0 - 20 mm/hr   Lipase    Specimen: Blood   Result Value Ref Range    Lipase 15 13 - 60 U/L   Amylase    Specimen: Blood   Result Value Ref Range    Amylase 63 30 - 110 U/L   Comprehensive metabolic panel    Specimen: Blood   Result Value Ref Range    Glucose 94 70 - 99 mg/dL    BUN 7 7 - 23 mg/dL    Creatinine 0.74 0.52 - 1.04 mg/dL    Sodium 142 137 - 145 mmol/L    Potassium 4.0 3.4 - 5.0 mmol/L    Chloride 107 101 - 112 mmol/L    CO2 28.0 22.0 - 30.0 mmol/L    Calcium 9.2 8.4 - 10.2 mg/dL    Total Protein 7.1 6.3 - 8.6 g/dL    Albumin 4.30 3.50 - 5.00 g/dL    ALT (SGPT) 12 <=35 U/L    AST (SGOT) 18 14 - 36 U/L    Alkaline Phosphatase 50 38 - 126 U/L    Total Bilirubin 0.2 0.2 - 1.3 mg/dL    eGFR Non  Amer 96 71 - 165 mL/min/1.73    Globulin 2.8 2.3 - 3.5 gm/dL    A/G Ratio 1.5 1.1 - 1.8 g/dL    BUN/Creatinine Ratio 9.5 7.0 - 25.0    Anion Gap 7.0 5.0 - 15.0 mmol/L   Results for orders placed or performed in visit on 04/23/19   CBC Auto Differential    Specimen: Blood   Result Value Ref Range    WBC 7.56 3.40 - 10.80 10*3/mm3    RBC 4.87 3.77 - 5.28 10*6/mm3    Hemoglobin 14.1 12.0 - 15.9 g/dL    Hematocrit 42.3 34.0 - 46.6 %    MCV 86.9 79.0 - 97.0 fL    MCH 29.0 26.6 - 33.0 pg    MCHC 33.3 31.5 - 35.7 g/dL    RDW 14.2 12.3 - 15.4 %    RDW-SD 44.4 37.0 - 54.0 fl    MPV 9.9 6.0 - 12.0 fL    Platelets 267 140 - 450 10*3/mm3    Neutrophil % 66.7 42.7 - 76.0 %    Lymphocyte % 20.0 19.6 - 45.3 %    Monocyte % 11.1 5.0 - 12.0 %    Eosinophil % 1.9 0.3 - 6.2 %    Basophil % 0.3 0.0 - 1.5 %    Neutrophils, Absolute 5.05 1.70 - 7.00 10*3/mm3    Lymphocytes, Absolute 1.51 0.70 - 3.10 10*3/mm3    Monocytes, Absolute 0.84 0.10 - 0.90 10*3/mm3    Eosinophils, Absolute 0.14 0.00 - 0.40 10*3/mm3    Basophils, Absolute 0.02 0.00 - 0.20 10*3/mm3   TSH    Specimen: Blood   Result Value Ref Range    TSH 2.570 0.270 - 4.200 mIU/mL   T4,  Free    Specimen: Blood   Result Value Ref Range    Free T4 1.32 0.93 - 1.70 ng/dL   Lipid Panel    Specimen: Blood   Result Value Ref Range    Total Cholesterol 154 150 - 200 mg/dL    Triglycerides 229 (H) <=150 mg/dL    HDL Cholesterol 42 40 - 59 mg/dL    LDL Cholesterol  66 <=100 mg/dL    VLDL Cholesterol 45.8 mg/dL    LDL/HDL Ratio 1.58 0.00 - 3.22   Comprehensive Metabolic Panel    Specimen: Blood   Result Value Ref Range    Glucose 93 70 - 99 mg/dL    BUN 9 7 - 23 mg/dL    Creatinine 0.73 0.52 - 1.04 mg/dL    Sodium 139 137 - 145 mmol/L    Potassium 4.1 3.4 - 5.0 mmol/L    Chloride 104 101 - 112 mmol/L    CO2 29.0 22.0 - 30.0 mmol/L    Calcium 9.0 8.4 - 10.2 mg/dL    Total Protein 7.0 6.3 - 8.6 g/dL    Albumin 4.30 3.50 - 5.00 g/dL    ALT (SGPT) 14 <=35 U/L    AST (SGOT) 16 14 - 36 U/L    Alkaline Phosphatase 56 38 - 126 U/L    Total Bilirubin 0.3 0.2 - 1.3 mg/dL    eGFR Non  Amer 99 71 - 165 mL/min/1.73    Globulin 2.7 2.3 - 3.5 gm/dL    A/G Ratio 1.6 1.1 - 1.8 g/dL    BUN/Creatinine Ratio 12.3 7.0 - 25.0    Anion Gap 6.0 5.0 - 15.0 mmol/L     *Note: Due to a large number of results and/or encounters for the requested time period, some results have not been displayed. A complete set of results can be found in Results Review.

## 2021-02-22 NOTE — PATIENT INSTRUCTIONS
Chronic Diarrhea  Chronic diarrhea is a condition in which a person passes frequent loose and watery stools for 4 weeks or longer. Non-chronic diarrhea usually lasts for only 2-3 days.  Diarrhea can cause a person to feel weak and dehydrated. Dehydration can make the person tired and thirsty. It can also cause a dry mouth, decreased urination, and dark yellow urine. Diarrhea is a sign of an underlying problem, such as:  · Infection.  · Side effects of medicines.  · Problems digesting something in your diet, such as milk products if you have lactose intolerance.  · Conditions such as celiac disease, irritable bowel syndrome (IBS), or inflammatory bowel disease (IBD).  If you have chronic diarrhea, make sure you treat it as told by your health care provider.  Follow these instructions at home:  Medicines  · Take over-the-counter and prescription medicines only as told by your health care provider.  · If you were prescribed an antibiotic medicine, take it as told by your health care provider. Do not stop taking the antibiotic even if you start to feel better.  Eating and drinking    · Follow instructions from your health care provider about what to eat and drink. You may have to:  ? Avoid foods that trigger diarrhea for you.  ? Take an oral rehydration solution (ORS). This is a drink that keeps you hydrated. It can be found at pharmacies and retail stores.  ? Drink clear fluids, such as water, diluted fruit juice, and low-calorie sports drinks. You can also get fluids by sucking on ice chips.  ? Drink enough fluid to keep your urine pale yellow. This will help you avoid dehydration.  ? Eat small amounts of bland foods that are easy to digest as you are able. These foods include bananas, applesauce, rice, lean meats, toast, and crackers.  ? Avoid spicy or fatty foods.  ? Avoid foods and beverages that contain a lot of sugar or caffeine.  · Do not drink alcohol if:  ? Your health care provider tells you not to  drink.  ? You are pregnant, may be pregnant, or are planning to become pregnant.  · If you drink alcohol:  ? Limit how much you use to:  § 0-1 drink a day for women.  § 0-2 drinks a day for men.  ? Be aware of how much alcohol is in your drink. In the [place] one drink equals one 12 oz bottle of beer (355 mL), one 5 oz glass of wine (148 mL), or one 1½ oz glass of hard liquor (44 mL).  General instructions    · Wash your hands often and after each diarrhea episode. Use soap and water. If soap and water are not available, use hand .  · Make sure that all people in your household wash their hands well and often.  · Rest as told by your health care provider.  · Watch your condition for any changes.  · Take a warm bath to relieve any burning or pain from frequent diarrhea episodes.  · Keep all follow-up visits as told by your health care provider. This is important.  Contact a health care provider if:  · You have a fever.  · Your diarrhea gets worse or does not get better.  · You have new symptoms.  · You cannot drink fluid without vomiting.  · You feel light-headed or dizzy.  · You have a headache.  · You have muscle cramps.  · You have severe pain in the rectum.  Get help right away if:  · You have vomiting that does not go away.  · You have chest pain.  · You feel very weak or you faint.  · You have bloody or black stools, or stools that look like tar.  · You have severe pain, cramping, or bloating in your abdomen, or pain that stays in one place.  · You have trouble breathing or you are breathing very quickly.  · Your heart is beating very quickly.  · Your skin feels cold and clammy.  · You feel confused.  · You have a severe headache.  · You have signs or symptoms of dehydration, such as:  ? Dark urine, very little urine, or no urine.  ? Cracked lips.  ? Dry mouth.  ? Sunken eyes.  ? Sleepiness.  ? Weakness.  These symptoms may represent a serious problem that is an emergency. Do not wait to see if the  symptoms will go away. Get medical help right away. Call your local emergency services (911 in the [place] Do not drive yourself to the hospital.  Summary  · Chronic diarrhea is a condition in which a person passes frequent loose and watery stools for 4 weeks or longer.  · Diarrhea is a sign of an underlying problem.  · Make sure you treat your diarrhea as told by your health care provider.  · Drink enough fluid to keep your urine pale yellow. This will help you avoid dehydration.  · Wash your hands often and after each diarrhea episode. If soap and water are not available, use hand .  This information is not intended to replace advice given to you by your health care provider. Make sure you discuss any questions you have with your health care provider.  Document Revised: 06/15/2020 Document Reviewed: 06/15/2020  Elsevier Patient Education © 2020 [employer]

## 2021-03-12 ENCOUNTER — LAB (OUTPATIENT)
Dept: LAB | Facility: HOSPITAL | Age: 26
End: 2021-03-12

## 2021-03-12 DIAGNOSIS — Z01.818 PREOP TESTING: Primary | ICD-10-CM

## 2021-03-12 PROCEDURE — U0004 COV-19 TEST NON-CDC HGH THRU: HCPCS

## 2021-03-12 PROCEDURE — C9803 HOPD COVID-19 SPEC COLLECT: HCPCS

## 2021-03-13 LAB — SARS-COV-2 ORF1AB RESP QL NAA+PROBE: NOT DETECTED

## 2021-03-15 ENCOUNTER — HOSPITAL ENCOUNTER (OUTPATIENT)
Facility: HOSPITAL | Age: 26
Setting detail: HOSPITAL OUTPATIENT SURGERY
Discharge: HOME | End: 2021-03-15
Attending: INTERNAL MEDICINE | Admitting: INTERNAL MEDICINE

## 2021-03-15 ENCOUNTER — ANESTHESIA EVENT (OUTPATIENT)
Dept: GASTROENTEROLOGY | Facility: HOSPITAL | Age: 26
End: 2021-03-15

## 2021-03-15 ENCOUNTER — ANESTHESIA (OUTPATIENT)
Dept: GASTROENTEROLOGY | Facility: HOSPITAL | Age: 26
End: 2021-03-15

## 2021-03-15 VITALS
WEIGHT: 182 LBS | TEMPERATURE: 98 F | HEART RATE: 72 BPM | SYSTOLIC BLOOD PRESSURE: 124 MMHG | HEIGHT: 66 IN | DIASTOLIC BLOOD PRESSURE: 69 MMHG | OXYGEN SATURATION: 100 % | BODY MASS INDEX: 29.25 KG/M2 | RESPIRATION RATE: 20 BRPM

## 2021-03-15 DIAGNOSIS — R10.84 GENERALIZED ABDOMINAL PAIN: ICD-10-CM

## 2021-03-15 DIAGNOSIS — R19.8 ABNORMAL BOWEL HABITS: ICD-10-CM

## 2021-03-15 DIAGNOSIS — R19.7 DIARRHEA, UNSPECIFIED TYPE: ICD-10-CM

## 2021-03-15 DIAGNOSIS — R19.5 MUCUS IN STOOL: ICD-10-CM

## 2021-03-15 PROCEDURE — 25010000002 PROPOFOL 10 MG/ML EMULSION: Performed by: NURSE ANESTHETIST, CERTIFIED REGISTERED

## 2021-03-15 PROCEDURE — 45380 COLONOSCOPY AND BIOPSY: CPT | Performed by: INTERNAL MEDICINE

## 2021-03-15 PROCEDURE — 43239 EGD BIOPSY SINGLE/MULTIPLE: CPT | Performed by: INTERNAL MEDICINE

## 2021-03-15 PROCEDURE — 25010000002 MIDAZOLAM PER 1 MG: Performed by: NURSE ANESTHETIST, CERTIFIED REGISTERED

## 2021-03-15 RX ORDER — DEXTROSE AND SODIUM CHLORIDE 5; .45 G/100ML; G/100ML
30 INJECTION, SOLUTION INTRAVENOUS CONTINUOUS PRN
Status: DISCONTINUED | OUTPATIENT
Start: 2021-03-15 | End: 2021-03-15 | Stop reason: HOSPADM

## 2021-03-15 RX ORDER — MIDAZOLAM HYDROCHLORIDE 1 MG/ML
INJECTION INTRAMUSCULAR; INTRAVENOUS AS NEEDED
Status: DISCONTINUED | OUTPATIENT
Start: 2021-03-15 | End: 2021-03-15 | Stop reason: SURG

## 2021-03-15 RX ORDER — PROPOFOL 10 MG/ML
VIAL (ML) INTRAVENOUS AS NEEDED
Status: DISCONTINUED | OUTPATIENT
Start: 2021-03-15 | End: 2021-03-15 | Stop reason: SURG

## 2021-03-15 RX ORDER — LIDOCAINE HYDROCHLORIDE 20 MG/ML
INJECTION, SOLUTION INTRAVENOUS AS NEEDED
Status: DISCONTINUED | OUTPATIENT
Start: 2021-03-15 | End: 2021-03-15 | Stop reason: SURG

## 2021-03-15 RX ADMIN — PROPOFOL 40 MG: 10 INJECTION, EMULSION INTRAVENOUS at 15:03

## 2021-03-15 RX ADMIN — MIDAZOLAM HYDROCHLORIDE 2 MG: 2 INJECTION, SOLUTION INTRAMUSCULAR; INTRAVENOUS at 14:38

## 2021-03-15 RX ADMIN — LIDOCAINE HYDROCHLORIDE 60 MG: 20 INJECTION, SOLUTION INTRAVENOUS at 14:48

## 2021-03-15 RX ADMIN — PROPOFOL 80 MG: 10 INJECTION, EMULSION INTRAVENOUS at 14:48

## 2021-03-15 RX ADMIN — PROPOFOL 70 MG: 10 INJECTION, EMULSION INTRAVENOUS at 14:52

## 2021-03-15 RX ADMIN — PROPOFOL 50 MG: 10 INJECTION, EMULSION INTRAVENOUS at 14:59

## 2021-03-15 RX ADMIN — DEXTROSE AND SODIUM CHLORIDE 30 ML/HR: 5; 450 INJECTION, SOLUTION INTRAVENOUS at 13:51

## 2021-03-15 NOTE — ANESTHESIA PREPROCEDURE EVALUATION
Anesthesia Evaluation     Patient summary reviewed and Nursing notes reviewed   NPO Solid Status: > 8 hours  NPO Liquid Status: > 4 hours           Airway   Mallampati: II  TM distance: >3 FB  No difficulty expected  Dental - normal exam     Pulmonary - normal exam   Cardiovascular - normal exam        Neuro/Psych  GI/Hepatic/Renal/Endo      ROS Comment: IBS    Musculoskeletal     Abdominal   (+) obese,    Substance History      OB/GYN          Other                        Anesthesia Plan    ASA 2     MAC     intravenous induction     Anesthetic plan, all risks, benefits, and alternatives have been provided, discussed and informed consent has been obtained with: patient.

## 2021-03-15 NOTE — ANESTHESIA POSTPROCEDURE EVALUATION
Patient: Tania Vital    Procedure Summary     Date: 03/15/21 Room / Location: [facility] ENDOSCOPY 1 / [facility]    Anesthesia Start: 1448 Anesthesia Stop: 1505    Procedures:       ESOPHAGOGASTRODUODENOSCOPY Monday (N/A )      COLONOSCOPY (N/A ) Diagnosis:       Generalized abdominal pain      Abnormal bowel habits      Diarrhea, unspecified type      Mucus in stool      (Generalized abdominal pain [R10.84])      (Abnormal bowel habits [R19.8])      (Diarrhea, unspecified type [R19.7])      (Mucus in stool [R19.5])    Surgeons: Tim Boucher MD Provider: Callie Rueda CRNA    Anesthesia Type: MAC ASA Status: 2          Anesthesia Type: MAC    Vitals  No vitals data found for the desired time range.          Post Anesthesia Care and Evaluation    Patient location during evaluation: bedside  Patient participation: complete - patient participated  Level of consciousness: sleepy but conscious  Pain score: 0  Pain management: adequate  Airway patency: patent  Anesthetic complications: No anesthetic complications  PONV Status: none  Cardiovascular status: hemodynamically stable  Respiratory status: spontaneous ventilation and room air  Hydration status: acceptable

## 2021-03-18 LAB
LAB AP CASE REPORT: NORMAL
PATH REPORT.FINAL DX SPEC: NORMAL

## 2021-03-22 ENCOUNTER — OFFICE VISIT (OUTPATIENT)
Dept: GASTROENTEROLOGY | Facility: CLINIC | Age: 26
End: 2021-03-22

## 2021-03-22 VITALS
HEART RATE: 86 BPM | SYSTOLIC BLOOD PRESSURE: 134 MMHG | HEIGHT: 66 IN | WEIGHT: 181.4 LBS | BODY MASS INDEX: 29.15 KG/M2 | DIASTOLIC BLOOD PRESSURE: 80 MMHG

## 2021-03-22 DIAGNOSIS — K21.00 GASTROESOPHAGEAL REFLUX DISEASE WITH ESOPHAGITIS WITHOUT HEMORRHAGE: Primary | ICD-10-CM

## 2021-03-22 DIAGNOSIS — K58.0 IRRITABLE BOWEL SYNDROME WITH DIARRHEA: ICD-10-CM

## 2021-03-22 PROCEDURE — 99213 OFFICE O/P EST LOW 20 MIN: CPT | Performed by: NURSE PRACTITIONER

## 2021-03-22 RX ORDER — OMEPRAZOLE 40 MG/1
40 CAPSULE, DELAYED RELEASE ORAL DAILY
Qty: 30 CAPSULE | Refills: 0 | Status: SHIPPED | OUTPATIENT
Start: 2021-03-22 | End: 2023-01-30

## 2021-03-22 RX ORDER — DICYCLOMINE HYDROCHLORIDE 10 MG/1
10 CAPSULE ORAL
Qty: 120 CAPSULE | Refills: 1 | Status: SHIPPED | OUTPATIENT
Start: 2021-03-22 | End: 2023-01-30

## 2021-03-22 NOTE — PATIENT INSTRUCTIONS
"BMI for Adults  What is BMI?  Body mass index (BMI) is a number that is calculated from a person's weight and height. BMI can help estimate how much of a person's weight is composed of fat. BMI does not measure body fat directly. Rather, it is an alternative to procedures that directly measure body fat, which can be difficult and expensive.  BMI can help identify people who may be at higher risk for certain medical problems.  What are BMI measurements used for?  BMI is used as a screening tool to identify possible weight problems. It helps determine whether a person is obese, overweight, a healthy weight, or underweight.  BMI is useful for:  · Identifying a weight problem that may be related to a medical condition or may increase the risk for medical problems.  · Promoting changes, such as changes in diet and exercise, to help reach a healthy weight. BMI screening can be repeated to see if these changes are working.  How is BMI calculated?  BMI involves measuring your weight in relation to your height. Both height and weight are measured, and the BMI is calculated from those numbers. This can be done either in English ([place] or metric measurements. Note that charts and online BMI calculators are available to help you find your BMI quickly and easily without having to do these calculations yourself.  To calculate your BMI in English ([place] measurements:    1. Measure your weight in pounds (lb).  2. Multiply the number of pounds by 703.  ? For example, for a person who weighs 180 lb, multiply that number by 703, which equals 126,540.  3. Measure your height in inches. Then multiply that number by itself to get a measurement called \"inches squared.\"  ? For example, for a person who is 70 inches tall, the \"inches squared\" measurement is 70 inches x 70 inches, which equals 4,900 inches squared.  4. Divide the total from step 2 (number of lb x 703) by the total from step 3 (inches squared): 126,540 ÷ 4,900 = 25.8. This is " "your BMI.  To calculate your BMI in metric measurements:  1. Measure your weight in kilograms (kg).  2. Measure your height in meters (m). Then multiply that number by itself to get a measurement called \"meters squared.\"  ? For example, for a person who is 1.75 m tall, the \"meters squared\" measurement is 1.75 m x 1.75 m, which is equal to 3.1 meters squared.  3. Divide the number of kilograms (your weight) by the meters squared number. In this example: 70 ÷ 3.1 = 22.6. This is your BMI.  What do the results mean?  BMI charts are used to identify whether you are underweight, normal weight, overweight, or obese. The following guidelines will be used:  · Underweight: BMI less than 18.5.  · Normal weight: BMI between 18.5 and 24.9.  · Overweight: BMI between 25 and 29.9.  · Obese: BMI of 30 or above.  Keep these notes in mind:  · Weight includes both fat and muscle, so someone with a muscular build, such as an athlete, may have a BMI that is higher than 24.9. In cases like these, BMI is not an accurate measure of body fat.  · To determine if excess body fat is the cause of a BMI of 25 or higher, further assessments may need to be done by a health care provider.  · BMI is usually interpreted in the same way for men and women.  Where to find more information  For more information about BMI, including tools to quickly calculate your BMI, go to these websites:  · Centers for Disease Control and Prevention: www.cdc.gov  · American Heart Association: www.heart.org  · National Heart, Lung, and [employer] [place]: www.nhlbi.nih.gov  Summary  · Body mass index (BMI) is a number that is calculated from a person's weight and height.  · BMI may help estimate how much of a person's weight is composed of fat. BMI can help identify those who may be at higher risk for certain medical problems.  · BMI can be measured using English measurements or metric measurements.  · BMI charts are used to identify whether you are underweight, normal " weight, overweight, or obese.  This information is not intended to replace advice given to you by your health care provider. Make sure you discuss any questions you have with your health care provider.  Document Revised: 09/09/2020 Document Reviewed: 07/17/2020  Elsevier Patient Education © 2021 [employer]

## 2021-03-22 NOTE — PROGRESS NOTES
Chief Complaint   Patient presents with   • Abdominal Pain   • Diarrhea       Subjective    Tania Vital is a 25 y.o. female. she is here today for follow-up.    25-year-old female presents for follow-up after EGD and colonoscopy done due to abdominal pain diarrhea and mucus within her stool.    Diarrhea   Associated symptoms include abdominal pain. Pertinent negatives include no arthralgias, chills, coughing, fever, headaches, myalgias or vomiting. Her past medical history is significant for irritable bowel syndrome.   Irritable Bowel Syndrome  This is a chronic problem. The problem occurs constantly. The problem has been waxing and waning. Associated symptoms include abdominal pain and fatigue. Pertinent negatives include no anorexia, arthralgias, change in bowel habit, chest pain, chills, congestion, coughing, diaphoresis, fever, headaches, joint swelling, myalgias, nausea, neck pain, numbness, rash, sore throat, swollen glands, urinary symptoms, vertigo, visual change, vomiting or weakness. The symptoms are aggravated by eating. The treatment provided moderate relief.   EGD noted mildly severe esophagitis gastritis and normal duodenum.  Antrum biopsy noted no significant histologic abnormality.  Esophageal biopsy noted reactive squamous mucosa.  Duodenal biopsy no no significant histologic abnormality.  Colonoscopy noted adequate prep normal perianal digital rectal exam external and internal hemorrhoids were found several biopsies were obtained which noted no significant histologic abnormality.         The following portions of the patient's history were reviewed and updated as appropriate:   Past Medical History:   Diagnosis Date   • Allergic    • Allergic rhinitis    • Diarrhea    • Headache    • Otalgia of right ear     due to pustule      • Visual impairment      Past Surgical History:   Procedure Laterality Date   • COLONOSCOPY N/A 3/15/2021    Procedure: COLONOSCOPY;  Surgeon: Tim Boucher MD;   Location: [facility];  Service: Gastroenterology;  Laterality: N/A;   • ENDOSCOPY N/A 3/15/2021    Procedure: ESOPHAGOGASTRODUODENOSCOPY Monday;  Surgeon: Tim Boucher MD;  Location: [facility];  Service: Gastroenterology;  Laterality: N/A;   • WISDOM TOOTH EXTRACTION       Family History   Problem Relation Age of Onset   • Cancer Mother    • Hypertension Mother    • Thyroid disease Mother    • Hypertension Father    • Heart disease Maternal Grandfather    • Heart disease Paternal Grandfather      OB History    No obstetric history on file.       Prior to Admission medications    Medication Sig Start Date End Date Taking? Authorizing Provider   cetirizine (zyrTEC) 10 MG tablet Take 10 mg by mouth Daily.   Yes ProviderGeena MD   Unable to find 1 each 1 (One) Time. Med Name: Nasacort nasal spray every morning   Yes ProviderGeena MD     Allergies   Allergen Reactions   • Penicillins Rash   • Other Rash     Vantyn     Social History     Socioeconomic History   • Marital status: Single     Spouse name: Not on file   • Number of children: Not on file   • Years of education: Not on file   • Highest education level: Not on file   Tobacco Use   • Smoking status: Never Smoker   • Smokeless tobacco: Never Used   • Tobacco comment: No smokers in the household.   Vaping Use   • Vaping Use: Never used   Substance and Sexual Activity   • Alcohol use: No   • Drug use: No       Review of Systems  Review of Systems   Constitutional: Positive for fatigue. Negative for activity change, appetite change, chills, diaphoresis, fever and unexpected weight change.   HENT: Negative for congestion, sore throat and trouble swallowing.    Respiratory: Negative for cough and shortness of breath.    Cardiovascular: Negative for chest pain.   Gastrointestinal: Positive for abdominal pain. Negative for abdominal distention, anal bleeding, anorexia, blood in stool, change in bowel habit, nausea, rectal pain and  "vomiting.   Musculoskeletal: Negative for arthralgias, joint swelling, myalgias and neck pain.   Skin: Negative for pallor and rash.   Neurological: Negative for vertigo, weakness, light-headedness, numbness and headaches.        /80 (BP Location: Left arm)   Pulse 86   Ht 167.6 cm (66\")   Wt 82.3 kg (181 lb 6.4 oz)   LMP 03/05/2021 (Approximate)   BMI 29.28 kg/m²     Objective    Physical Exam  Constitutional:       General: She is not in acute distress.     Appearance: Normal appearance. She is well-developed.   Neck:      Thyroid: No thyroid mass or thyromegaly.   Cardiovascular:      Rate and Rhythm: Normal rate and regular rhythm.      Heart sounds: Normal heart sounds.   Pulmonary:      Effort: Pulmonary effort is normal. No respiratory distress.      Breath sounds: Normal breath sounds.   Abdominal:      General: Bowel sounds are normal. There is no distension.      Palpations: Abdomen is soft.      Tenderness: There is generalized abdominal tenderness.      Hernia: No hernia is present.       Admission on 03/15/2021, Discharged on 03/15/2021   Component Date Value Ref Range Status   • Case Report 03/15/2021    Final                    Value:Surgical Pathology Report                         Case: [id number]                                  Authorizing Provider:  Tim Boucher MD        Collected:           03/15/2021 03:04 PM          Ordering Location:     [facility]             Received:            03/16/2021 06:59 AM                                 [place] [facility]                                                     Pathologist:           Brandt Villegas MD                                                           Specimens:   1) - Small Intestine, Duodenum                                                                      2) - Gastric, Antrum                                                                                3) - Esophagus, Distal                                     "                                          4) - Small Intestine, terminal ileum                                                                5) - Large Intestine, colonic mucosa                                                      • Final Diagnosis 03/15/2021    Final                    Value:This result contains rich text formatting which cannot be displayed here.     Assessment/Plan      1. Gastroesophageal reflux disease with esophagitis without hemorrhage    2. Irritable bowel syndrome with diarrhea    .   We will start patient on PPI daily for chronic reflux discussed with patient doing medication for about a month and discontinuing.  Xifaxan 550 mg 2 times a day for irritable syndrome diarrhea predominant may take Bentyl for breakthrough symptoms.  Follow-up in 8 weeks for recheck return office sooner if needed  Orders placed during this encounter include:  No orders of the defined types were placed in this encounter.      * Surgery not found *    Review and/or summary of lab tests, radiology, procedures, medications. Review and summary of old records and obtaining of history. The risks and benefits of my recommendations, as well as other treatment options were discussed with the patient today. Questions were answered.    New Medications Ordered This Visit   Medications   • riFAXIMin (XIFAXAN) 550 MG tablet     Sig: Take 1 tablet by mouth 3 (Three) Times a Day.     Dispense:  42 tablet     Refill:  1   • omeprazole (priLOSEC) 40 MG capsule     Sig: Take 1 capsule by mouth Daily.     Dispense:  30 capsule     Refill:  0   • dicyclomine (Bentyl) 10 MG capsule     Sig: Take 1 capsule by mouth 4 (Four) Times a Day Before Meals & at Bedtime.     Dispense:  120 capsule     Refill:  1       Follow-up: Return in about 8 weeks (around 5/17/2021) for Recheck.          This document has been electronically signed by LELIA Boogie on March 22, 2021 11:48 CDT           I spent 20 minutes caring for Tania on this date  of service. This time includes time spent by me in the following activities:preparing for the visit, reviewing tests, obtaining and/or reviewing a separately obtained history, performing a medically appropriate examination and/or evaluation , counseling and educating the patient/family/caregiver, ordering medications, tests, or procedures, referring and communicating with other health care professionals , documenting information in the medical record and care coordination    Results for orders placed or performed during the hospital encounter of 03/15/21   Tissue Pathology Exam    Specimen: A: Small Intestine, Duodenum; Tissue    B: Gastric, Antrum; Tissue    C: Esophagus, Distal; Tissue    D: Small Intestine; Tissue    E: Large Intestine; Tissue   Result Value Ref Range    Case Report       Surgical Pathology Report                         Case: [id number]                                  Authorizing Provider:  Tim Boucher MD        Collected:           03/15/2021 03:04 PM          Ordering Location:     [facility]             Received:            03/16/2021 06:59 AM                                 [place] [facility]                                                     Pathologist:           Brandt Villegas MD                                                           Specimens:   1) - Small Intestine, Duodenum                                                                      2) - Gastric, Antrum                                                                                3) - Esophagus, Distal                                                                              4) - Small Intestine, terminal ileum                                                                5) - Large Intestine, colonic mucosa                                                       Final Diagnosis       SEE SCANNED REPORT       Results for orders placed or performed in visit on 03/12/21   COVID-19,[facility]  IN-HOUSE, NP/OP SWAB IN UTM/VTM/[facility] TRANSPORT MEDIA,24 HR TAT - Swab, Nasopharynx    Specimen: Nasopharynx; Swab   Result Value Ref Range    COVID19 Not Detected Not Detected - Ref. Range   Results for orders placed or performed in visit on 02/08/21   Glia(IgA / G) & TTG(IgA / G)    Specimen: Blood   Result Value Ref Range    Gliadin Deamidated Peptide Ab, IgA 3 0 - 19 units    Deaminated Gliadin Ab IgG 3 0 - 19 units    Tissue Transglutaminase IgA <2 0 - 3 U/mL    Tissue Transglutaminase IgG 3 0 - 5 U/mL   Allergens (12) Foods    Specimen: Blood   Result Value Ref Range    Class Description Comment     Egg White 0.12 (A) Class 0/I kU/L    Milk, Cow's <0.10 Class 0 kU/L    CodFish <0.10 Class 0 kU/L    Sesame Seed <0.10 Class 0 kU/L    Peanut <0.10 Class 0 kU/L    Soybean <0.10 Class 0 kU/L    Hazelnut <0.10 Class 0 kU/L    Shrimp <0.10 Class 0 kU/L    Scallop <0.10 Class 0 kU/L    Gluten <0.10 Class 0 kU/L    Media <0.10 Class 0 kU/L    Wheat <0.10 Class 0 kU/L   Results for orders placed or performed in visit on 05/19/20   Occult Blood X 1, Stool - Stool, Per Rectum    Specimen: Per Rectum; Stool   Result Value Ref Range    Fecal Occult Blood Negative Negative   Results for orders placed or performed in visit on 05/18/20   H.pylori,IgG / IgA Antibodies    Specimen: Blood   Result Value Ref Range    H. pylori, IgA ABS <9.0 0.0 - 8.9 units    H. pylori IgG 0.27 0.00 - 0.79 Index Value   CBC Auto Differential    Specimen: Blood   Result Value Ref Range    WBC 7.48 3.40 - 10.80 10*3/mm3    RBC 4.82 3.77 - 5.28 10*6/mm3    Hemoglobin 14.2 12.0 - 15.9 g/dL    Hematocrit 41.6 34.0 - 46.6 %    MCV 86.3 79.0 - 97.0 fL    MCH 29.5 26.6 - 33.0 pg    MCHC 34.1 31.5 - 35.7 g/dL    RDW 13.3 12.3 - 15.4 %    RDW-SD 41.7 37.0 - 54.0 fl    MPV 10.1 6.0 - 12.0 fL    Platelets 270 140 - 450 10*3/mm3    Neutrophil % 69.0 42.7 - 76.0 %    Lymphocyte % 18.4 (L) 19.6 - 45.3 %    Monocyte % 10.6 5.0 - 12.0 %    Eosinophil % 1.7 0.3 -  6.2 %    Basophil % 0.3 0.0 - 1.5 %    Neutrophils, Absolute 5.16 1.70 - 7.00 10*3/mm3    Lymphocytes, Absolute 1.38 0.70 - 3.10 10*3/mm3    Monocytes, Absolute 0.79 0.10 - 0.90 10*3/mm3    Eosinophils, Absolute 0.13 0.00 - 0.40 10*3/mm3    Basophils, Absolute 0.02 0.00 - 0.20 10*3/mm3   Sedimentation Rate    Specimen: Blood   Result Value Ref Range    Sed Rate 2 0 - 20 mm/hr   Lipase    Specimen: Blood   Result Value Ref Range    Lipase 15 13 - 60 U/L   Amylase    Specimen: Blood   Result Value Ref Range    Amylase 63 30 - 110 U/L   Comprehensive metabolic panel    Specimen: Blood   Result Value Ref Range    Glucose 94 70 - 99 mg/dL    BUN 7 7 - 23 mg/dL    Creatinine 0.74 0.52 - 1.04 mg/dL    Sodium 142 137 - 145 mmol/L    Potassium 4.0 3.4 - 5.0 mmol/L    Chloride 107 101 - 112 mmol/L    CO2 28.0 22.0 - 30.0 mmol/L    Calcium 9.2 8.4 - 10.2 mg/dL    Total Protein 7.1 6.3 - 8.6 g/dL    Albumin 4.30 3.50 - 5.00 g/dL    ALT (SGPT) 12 <=35 U/L    AST (SGOT) 18 14 - 36 U/L    Alkaline Phosphatase 50 38 - 126 U/L    Total Bilirubin 0.2 0.2 - 1.3 mg/dL    eGFR Non  Amer 96 71 - 165 mL/min/1.73    Globulin 2.8 2.3 - 3.5 gm/dL    A/G Ratio 1.5 1.1 - 1.8 g/dL    BUN/Creatinine Ratio 9.5 7.0 - 25.0    Anion Gap 7.0 5.0 - 15.0 mmol/L   Results for orders placed or performed in visit on 04/23/19   CBC Auto Differential    Specimen: Blood   Result Value Ref Range    WBC 7.56 3.40 - 10.80 10*3/mm3    RBC 4.87 3.77 - 5.28 10*6/mm3    Hemoglobin 14.1 12.0 - 15.9 g/dL    Hematocrit 42.3 34.0 - 46.6 %    MCV 86.9 79.0 - 97.0 fL    MCH 29.0 26.6 - 33.0 pg    MCHC 33.3 31.5 - 35.7 g/dL    RDW 14.2 12.3 - 15.4 %    RDW-SD 44.4 37.0 - 54.0 fl    MPV 9.9 6.0 - 12.0 fL    Platelets 267 140 - 450 10*3/mm3    Neutrophil % 66.7 42.7 - 76.0 %    Lymphocyte % 20.0 19.6 - 45.3 %    Monocyte % 11.1 5.0 - 12.0 %    Eosinophil % 1.9 0.3 - 6.2 %    Basophil % 0.3 0.0 - 1.5 %    Neutrophils, Absolute 5.05 1.70 - 7.00 10*3/mm3    Lymphocytes,  Absolute 1.51 0.70 - 3.10 10*3/mm3    Monocytes, Absolute 0.84 0.10 - 0.90 10*3/mm3    Eosinophils, Absolute 0.14 0.00 - 0.40 10*3/mm3    Basophils, Absolute 0.02 0.00 - 0.20 10*3/mm3   TSH    Specimen: Blood   Result Value Ref Range    TSH 2.570 0.270 - 4.200 mIU/mL   T4, Free    Specimen: Blood   Result Value Ref Range    Free T4 1.32 0.93 - 1.70 ng/dL   Lipid Panel    Specimen: Blood   Result Value Ref Range    Total Cholesterol 154 150 - 200 mg/dL    Triglycerides 229 (H) <=150 mg/dL    HDL Cholesterol 42 40 - 59 mg/dL    LDL Cholesterol  66 <=100 mg/dL    VLDL Cholesterol 45.8 mg/dL    LDL/HDL Ratio 1.58 0.00 - 3.22   Comprehensive Metabolic Panel    Specimen: Blood   Result Value Ref Range    Glucose 93 70 - 99 mg/dL    BUN 9 7 - 23 mg/dL    Creatinine 0.73 0.52 - 1.04 mg/dL    Sodium 139 137 - 145 mmol/L    Potassium 4.1 3.4 - 5.0 mmol/L    Chloride 104 101 - 112 mmol/L    CO2 29.0 22.0 - 30.0 mmol/L    Calcium 9.0 8.4 - 10.2 mg/dL    Total Protein 7.0 6.3 - 8.6 g/dL    Albumin 4.30 3.50 - 5.00 g/dL    ALT (SGPT) 14 <=35 U/L    AST (SGOT) 16 14 - 36 U/L    Alkaline Phosphatase 56 38 - 126 U/L    Total Bilirubin 0.3 0.2 - 1.3 mg/dL    eGFR Non  Amer 99 71 - 165 mL/min/1.73    Globulin 2.7 2.3 - 3.5 gm/dL    A/G Ratio 1.6 1.1 - 1.8 g/dL    BUN/Creatinine Ratio 12.3 7.0 - 25.0    Anion Gap 6.0 5.0 - 15.0 mmol/L     *Note: Due to a large number of results and/or encounters for the requested time period, some results have not been displayed. A complete set of results can be found in Results Review.

## 2021-03-25 ENCOUNTER — TELEPHONE (OUTPATIENT)
Dept: GASTROENTEROLOGY | Facility: CLINIC | Age: 26
End: 2021-03-25

## 2021-03-25 NOTE — TELEPHONE ENCOUNTER
Contacted patients motherAnaya with sample information.       ----- Message from LELIA Og sent at 3/24/2021  4:54 PM CDT -----  Contact: [phone]  Ok I also sent bentyl for her to try in case of that. Set aside samples for her if we get any.   ----- Message -----  From: Shelly Escalante MA  Sent: 3/23/2021  10:02 AM CDT  To: LELIA Og    PA was denied   ----- Message -----  From: Shaunna Marie  Sent: 3/23/2021   9:51 AM CDT  To: Shelly Escalante MA    Patient states the medication xifanan is not covered by insurance is going to cost patient over 2000 dollars. Patient is wanting to know if there is something different can call in. Patient pharmacy is [facility] in [place].  Cell phone [phone]

## 2021-05-17 ENCOUNTER — OFFICE VISIT (OUTPATIENT)
Dept: GASTROENTEROLOGY | Facility: CLINIC | Age: 26
End: 2021-05-17

## 2021-05-17 VITALS
SYSTOLIC BLOOD PRESSURE: 121 MMHG | WEIGHT: 184.4 LBS | BODY MASS INDEX: 29.63 KG/M2 | HEIGHT: 66 IN | HEART RATE: 76 BPM | DIASTOLIC BLOOD PRESSURE: 80 MMHG

## 2021-05-17 DIAGNOSIS — K58.0 IRRITABLE BOWEL SYNDROME WITH DIARRHEA: Primary | ICD-10-CM

## 2021-05-17 DIAGNOSIS — K21.00 GASTROESOPHAGEAL REFLUX DISEASE WITH ESOPHAGITIS WITHOUT HEMORRHAGE: ICD-10-CM

## 2021-05-17 PROCEDURE — 99213 OFFICE O/P EST LOW 20 MIN: CPT | Performed by: NURSE PRACTITIONER

## 2021-05-17 NOTE — PROGRESS NOTES
Chief Complaint   Patient presents with   • Abdominal Pain   • Diarrhea       Subjective    Tania Vital is a 25 y.o. female. she is here today for follow-up.    History of Present Illness  25-year-old female presents for recheck regarding irritable bowel syndrome diarrhea predominant after treatment with Xifaxan.  States she is only had one episode when she ate lettuce otherwise bowel movements have been about 1/day denies any abdominal pain nausea vomiting, melena or hematochezia.  She has not required Bentyl in some time.  Omeprazole still controls reflux very well.  Denies any symptoms of reflux or indigestion as long she takes medication daily.       The following portions of the patient's history were reviewed and updated as appropriate:   Past Medical History:   Diagnosis Date   • Allergic    • Allergic rhinitis    • Diarrhea    • Headache    • Otalgia of right ear     due to pustule      • Visual impairment      Past Surgical History:   Procedure Laterality Date   • COLONOSCOPY N/A 3/15/2021    Procedure: COLONOSCOPY;  Surgeon: Tim Boucher MD;  Location: [facility] ENDOSCOPY;  Service: Gastroenterology;  Laterality: N/A;   • ENDOSCOPY N/A 3/15/2021    Procedure: ESOPHAGOGASTRODUODENOSCOPY Monday;  Surgeon: Tim Boucher MD;  Location: [facility] ENDOSCOPY;  Service: Gastroenterology;  Laterality: N/A;   • WISDOM TOOTH EXTRACTION       Family History   Problem Relation Age of Onset   • Cancer Mother    • Hypertension Mother    • Thyroid disease Mother    • Hypertension Father    • Heart disease Maternal Grandfather    • Heart disease Paternal Grandfather      OB History    No obstetric history on file.       Prior to Admission medications    Medication Sig Start Date End Date Taking? Authorizing Provider   cetirizine (zyrTEC) 10 MG tablet Take 10 mg by mouth Daily.   Yes Provider, MD Geena   dicyclomine (Bentyl) 10 MG capsule Take 1 capsule by mouth 4 (Four) Times a Day Before Meals & at  "Bedtime.  Patient taking differently: Take 10 mg by mouth 4 (Four) Times a Day Before Meals & at Bedtime. Take as needed 3/22/21   Gissell Stephen APRN   omeprazole (priLOSEC) 40 MG capsule Take 1 capsule by mouth Daily.  Patient taking differently: Take 40 mg by mouth Daily. Patient taking as needed 3/22/21   Gissell Stephen A APRN   riFAXIMin (XIFAXAN) 550 MG tablet Take 1 tablet by mouth 3 (Three) Times a Day.  Patient taking differently: Take 550 mg by mouth 3 (Three) Times a Day. Patient taking as needed 3/22/21   StephenGissell A APRN   Unable to find 1 each 1 (One) Time. Med Name: Nasacort nasal spray every morning    Provider, Historical, MD     Allergies   Allergen Reactions   • Penicillins Rash   • Other Rash     Vantyn     Social History     Socioeconomic History   • Marital status: Single     Spouse name: Not on file   • Number of children: Not on file   • Years of education: Not on file   • Highest education level: Not on file   Tobacco Use   • Smoking status: Never Smoker   • Smokeless tobacco: Never Used   • Tobacco comment: No smokers in the household.   Vaping Use   • Vaping Use: Never used   Substance and Sexual Activity   • Alcohol use: No   • Drug use: No       Review of Systems  Review of Systems   Constitutional: Negative for activity change, appetite change, chills, diaphoresis, fatigue, fever and unexpected weight change.   HENT: Negative for sore throat and trouble swallowing.    Respiratory: Negative for shortness of breath.    Gastrointestinal: Negative for abdominal distention, abdominal pain, anal bleeding, blood in stool, constipation, diarrhea, nausea, rectal pain and vomiting.   Musculoskeletal: Negative for arthralgias.   Skin: Negative for pallor.   Neurological: Negative for light-headedness.        /80 (BP Location: Left arm)   Pulse 76   Ht 167.6 cm (66\")   Wt 83.6 kg (184 lb 6.4 oz)   BMI 29.76 kg/m²     Objective    Physical Exam  Constitutional:       General: She is not in " acute distress.     Appearance: Normal appearance. She is well-developed.   HENT:      Head: Normocephalic and atraumatic.   Neck:      Thyroid: No thyromegaly.   Cardiovascular:      Rate and Rhythm: Normal rate and regular rhythm.      Heart sounds: Normal heart sounds.   Pulmonary:      Effort: Pulmonary effort is normal.      Breath sounds: Normal breath sounds. No wheezing, rhonchi or rales.   Abdominal:      General: Bowel sounds are normal. There is no distension.      Palpations: Abdomen is soft. Abdomen is not rigid.      Tenderness: There is no abdominal tenderness. There is no guarding.      Hernia: No hernia is present.   Musculoskeletal:      Cervical back: Normal range of motion and neck supple.   Lymphadenopathy:      Cervical: No cervical adenopathy.   Skin:     General: Skin is warm and dry.      Coloration: Skin is not pale.      Findings: No rash.   Neurological:      Mental Status: She is alert and oriented to person, place, and time.   Psychiatric:         Speech: Speech normal.         Behavior: Behavior is cooperative.       Admission on 03/15/2021, Discharged on 03/15/2021   Component Date Value Ref Range Status   • Case Report 03/15/2021    Final                    Value:Surgical Pathology Report                         Case: [id number]                                  Authorizing Provider:  Tim Boucher MD        Collected:           03/15/2021 03:04 PM          Ordering Location:     [facility]             Received:            03/16/2021 06:59 AM                                 [facility]                                                     Pathologist:           Brandt Villegas MD                                                           Specimens:   1) - Small Intestine, Duodenum                                                                      2) - Gastric, Antrum                                                                                3) - Esophagus,  Distal                                                                              4) - Small Intestine, terminal ileum                                                                5) - Large Intestine, colonic mucosa                                                      • Final Diagnosis 03/15/2021    Final                    Value:This result contains rich text formatting which cannot be displayed here.     Assessment/Plan      1. Irritable bowel syndrome with diarrhea    2. Gastroesophageal reflux disease with esophagitis without hemorrhage    .   Continue with current diet modifications my refill Xifaxan if flare of symptoms please contact office if that occurs.  Continue omeprazole daily follow-up in 1 year for recheck return office sooner if needed    Orders placed during this encounter include:  No orders of the defined types were placed in this encounter.      * Surgery not found *    Review and/or summary of lab tests, radiology, procedures, medications. Review and summary of old records and obtaining of history. The risks and benefits of my recommendations, as well as other treatment options were discussed with the patient today. Questions were answered.    No orders of the defined types were placed in this encounter.      Follow-up: Return in about 1 year (around 5/17/2022).          This document has been electronically signed by LELIA Boogie on May 17, 2021 10:52 CDT           I spent 15 minutes caring for Tania on this date of service. This time includes time spent by me in the following activities:preparing for the visit, reviewing tests, obtaining and/or reviewing a separately obtained history, performing a medically appropriate examination and/or evaluation , counseling and educating the patient/family/caregiver, ordering medications, tests, or procedures, referring and communicating with other health care professionals , documenting information in the medical record and care  coordination    Results for orders placed or performed during the hospital encounter of 03/15/21   Tissue Pathology Exam    Specimen: A: Small Intestine, Duodenum; Tissue    B: Gastric, Antrum; Tissue    C: Esophagus, Distal; Tissue    D: Small Intestine; Tissue    E: Large Intestine; Tissue   Result Value Ref Range    Case Report       Surgical Pathology Report                         Case: [id number]                                  Authorizing Provider:  Tim Boucher MD        Collected:           03/15/2021 03:04 PM          Ordering Location:     [facility]             Received:            03/16/2021 06:59 AM                                 [place] [facility]                                                     Pathologist:           Brandt Villegas MD                                                           Specimens:   1) - Small Intestine, Duodenum                                                                      2) - Gastric, Antrum                                                                                3) - Esophagus, Distal                                                                              4) - Small Intestine, terminal ileum                                                                5) - Large Intestine, colonic mucosa                                                       Final Diagnosis       SEE SCANNED REPORT       Results for orders placed or performed in visit on 03/12/21   COVID-19,[facility] PANTHER,[facility] IN-HOUSE, NP/OP SWAB IN UTM/VTM/[facility] TRANSPORT MEDIA,24 HR TAT - Swab, Nasopharynx    Specimen: Nasopharynx; Swab   Result Value Ref Range    COVID19 Not Detected Not Detected - Ref. Range   Results for orders placed or performed in visit on 02/08/21   Glia(IgA / G) & TTG(IgA / G)    Specimen: Blood   Result Value Ref Range    Gliadin Deamidated Peptide Ab, IgA 3 0 - 19 units    Deaminated Gliadin Ab IgG 3 0 - 19 units    Tissue Transglutaminase IgA <2 0 - 3 U/mL     Tissue Transglutaminase IgG 3 0 - 5 U/mL   Allergens (12) Foods    Specimen: Blood   Result Value Ref Range    Class Description Comment     Egg White 0.12 (A) Class 0/I kU/L    Milk, Cow's <0.10 Class 0 kU/L    CodFish <0.10 Class 0 kU/L    Sesame Seed <0.10 Class 0 kU/L    Peanut <0.10 Class 0 kU/L    Soybean <0.10 Class 0 kU/L    Hazelnut <0.10 Class 0 kU/L    Shrimp <0.10 Class 0 kU/L    Scallop <0.10 Class 0 kU/L    Gluten <0.10 Class 0 kU/L    [place] <0.10 Class 0 kU/L    Wheat <0.10 Class 0 kU/L   Results for orders placed or performed in visit on 05/19/20   Occult Blood X 1, Stool - Stool, Per Rectum    Specimen: Per Rectum; Stool   Result Value Ref Range    Fecal Occult Blood Negative Negative   Results for orders placed or performed in visit on 05/18/20   H.pylori,IgG / IgA Antibodies    Specimen: Blood   Result Value Ref Range    H. pylori, IgA ABS <9.0 0.0 - 8.9 units    H. pylori IgG 0.27 0.00 - 0.79 Index Value   CBC Auto Differential    Specimen: Blood   Result Value Ref Range    WBC 7.48 3.40 - 10.80 10*3/mm3    RBC 4.82 3.77 - 5.28 10*6/mm3    Hemoglobin 14.2 12.0 - 15.9 g/dL    Hematocrit 41.6 34.0 - 46.6 %    MCV 86.3 79.0 - 97.0 fL    MCH 29.5 26.6 - 33.0 pg    MCHC 34.1 31.5 - 35.7 g/dL    RDW 13.3 12.3 - 15.4 %    RDW-SD 41.7 37.0 - 54.0 fl    MPV 10.1 6.0 - 12.0 fL    Platelets 270 140 - 450 10*3/mm3    Neutrophil % 69.0 42.7 - 76.0 %    Lymphocyte % 18.4 (L) 19.6 - 45.3 %    Monocyte % 10.6 5.0 - 12.0 %    Eosinophil % 1.7 0.3 - 6.2 %    Basophil % 0.3 0.0 - 1.5 %    Neutrophils, Absolute 5.16 1.70 - 7.00 10*3/mm3    Lymphocytes, Absolute 1.38 0.70 - 3.10 10*3/mm3    Monocytes, Absolute 0.79 0.10 - 0.90 10*3/mm3    Eosinophils, Absolute 0.13 0.00 - 0.40 10*3/mm3    Basophils, Absolute 0.02 0.00 - 0.20 10*3/mm3   Sedimentation Rate    Specimen: Blood   Result Value Ref Range    Sed Rate 2 0 - 20 mm/hr   Lipase    Specimen: Blood   Result Value Ref Range    Lipase 15 13 - 60 U/L   Amylase     Specimen: Blood   Result Value Ref Range    Amylase 63 30 - 110 U/L   Comprehensive metabolic panel    Specimen: Blood   Result Value Ref Range    Glucose 94 70 - 99 mg/dL    BUN 7 7 - 23 mg/dL    Creatinine 0.74 0.52 - 1.04 mg/dL    Sodium 142 137 - 145 mmol/L    Potassium 4.0 3.4 - 5.0 mmol/L    Chloride 107 101 - 112 mmol/L    CO2 28.0 22.0 - 30.0 mmol/L    Calcium 9.2 8.4 - 10.2 mg/dL    Total Protein 7.1 6.3 - 8.6 g/dL    Albumin 4.30 3.50 - 5.00 g/dL    ALT (SGPT) 12 <=35 U/L    AST (SGOT) 18 14 - 36 U/L    Alkaline Phosphatase 50 38 - 126 U/L    Total Bilirubin 0.2 0.2 - 1.3 mg/dL    eGFR Non  Amer 96 71 - 165 mL/min/1.73    Globulin 2.8 2.3 - 3.5 gm/dL    A/G Ratio 1.5 1.1 - 1.8 g/dL    BUN/Creatinine Ratio 9.5 7.0 - 25.0    Anion Gap 7.0 5.0 - 15.0 mmol/L   Results for orders placed or performed in visit on 04/23/19   CBC Auto Differential    Specimen: Blood   Result Value Ref Range    WBC 7.56 3.40 - 10.80 10*3/mm3    RBC 4.87 3.77 - 5.28 10*6/mm3    Hemoglobin 14.1 12.0 - 15.9 g/dL    Hematocrit 42.3 34.0 - 46.6 %    MCV 86.9 79.0 - 97.0 fL    MCH 29.0 26.6 - 33.0 pg    MCHC 33.3 31.5 - 35.7 g/dL    RDW 14.2 12.3 - 15.4 %    RDW-SD 44.4 37.0 - 54.0 fl    MPV 9.9 6.0 - 12.0 fL    Platelets 267 140 - 450 10*3/mm3    Neutrophil % 66.7 42.7 - 76.0 %    Lymphocyte % 20.0 19.6 - 45.3 %    Monocyte % 11.1 5.0 - 12.0 %    Eosinophil % 1.9 0.3 - 6.2 %    Basophil % 0.3 0.0 - 1.5 %    Neutrophils, Absolute 5.05 1.70 - 7.00 10*3/mm3    Lymphocytes, Absolute 1.51 0.70 - 3.10 10*3/mm3    Monocytes, Absolute 0.84 0.10 - 0.90 10*3/mm3    Eosinophils, Absolute 0.14 0.00 - 0.40 10*3/mm3    Basophils, Absolute 0.02 0.00 - 0.20 10*3/mm3   TSH    Specimen: Blood   Result Value Ref Range    TSH 2.570 0.270 - 4.200 mIU/mL   T4, Free    Specimen: Blood   Result Value Ref Range    Free T4 1.32 0.93 - 1.70 ng/dL   Lipid Panel    Specimen: Blood   Result Value Ref Range    Total Cholesterol 154 150 - 200 mg/dL     Triglycerides 229 (H) <=150 mg/dL    HDL Cholesterol 42 40 - 59 mg/dL    LDL Cholesterol  66 <=100 mg/dL    VLDL Cholesterol 45.8 mg/dL    LDL/HDL Ratio 1.58 0.00 - 3.22   Comprehensive Metabolic Panel    Specimen: Blood   Result Value Ref Range    Glucose 93 70 - 99 mg/dL    BUN 9 7 - 23 mg/dL    Creatinine 0.73 0.52 - 1.04 mg/dL    Sodium 139 137 - 145 mmol/L    Potassium 4.1 3.4 - 5.0 mmol/L    Chloride 104 101 - 112 mmol/L    CO2 29.0 22.0 - 30.0 mmol/L    Calcium 9.0 8.4 - 10.2 mg/dL    Total Protein 7.0 6.3 - 8.6 g/dL    Albumin 4.30 3.50 - 5.00 g/dL    ALT (SGPT) 14 <=35 U/L    AST (SGOT) 16 14 - 36 U/L    Alkaline Phosphatase 56 38 - 126 U/L    Total Bilirubin 0.3 0.2 - 1.3 mg/dL    eGFR Non  Amer 99 71 - 165 mL/min/1.73    Globulin 2.7 2.3 - 3.5 gm/dL    A/G Ratio 1.6 1.1 - 1.8 g/dL    BUN/Creatinine Ratio 12.3 7.0 - 25.0    Anion Gap 6.0 5.0 - 15.0 mmol/L     *Note: Due to a large number of results and/or encounters for the requested time period, some results have not been displayed. A complete set of results can be found in Results Review.

## 2021-05-17 NOTE — PATIENT INSTRUCTIONS
"BMI for Adults  What is BMI?  Body mass index (BMI) is a number that is calculated from a person's weight and height. BMI can help estimate how much of a person's weight is composed of fat. BMI does not measure body fat directly. Rather, it is an alternative to procedures that directly measure body fat, which can be difficult and expensive.  BMI can help identify people who may be at higher risk for certain medical problems.  What are BMI measurements used for?  BMI is used as a screening tool to identify possible weight problems. It helps determine whether a person is obese, overweight, a healthy weight, or underweight.  BMI is useful for:  · Identifying a weight problem that may be related to a medical condition or may increase the risk for medical problems.  · Promoting changes, such as changes in diet and exercise, to help reach a healthy weight. BMI screening can be repeated to see if these changes are working.  How is BMI calculated?  BMI involves measuring your weight in relation to your height. Both height and weight are measured, and the BMI is calculated from those numbers. This can be done either in English ([place] or metric measurements. Note that charts and online BMI calculators are available to help you find your BMI quickly and easily without having to do these calculations yourself.  To calculate your BMI in English (U.S[place] measurements:    1. Measure your weight in pounds (lb).  2. Multiply the number of pounds by 703.  ? For example, for a person who weighs 180 lb, multiply that number by 703, which equals [phone],540.  3. Measure your height in inches. Then multiply that number by itself to get a measurement called \"inches squared.\"  ? For example, for a person who is 70 inches tall, the \"inches squared\" measurement is 70 inches x 70 inches, which equals 4,900 inches squared.  4. Divide the total from step 2 (number of lb x 703) by the total from step 3 (inches squared): 126,540 ÷ 4,900 = 25.8. This is " "your BMI.  To calculate your BMI in metric measurements:  1. Measure your weight in kilograms (kg).  2. Measure your height in meters (m). Then multiply that number by itself to get a measurement called \"meters squared.\"  ? For example, for a person who is 1.75 m tall, the \"meters squared\" measurement is 1.75 m x 1.75 m, which is equal to 3.1 meters squared.  3. Divide the number of kilograms (your weight) by the meters squared number. In this example: 70 ÷ 3.1 = 22.6. This is your BMI.  What do the results mean?  BMI charts are used to identify whether you are underweight, normal weight, overweight, or obese. The following guidelines will be used:  · Underweight: BMI less than 18.5.  · Normal weight: BMI between 18.5 and 24.9.  · Overweight: BMI between 25 and 29.9.  · Obese: BMI of 30 or above.  Keep these notes in mind:  · Weight includes both fat and muscle, so someone with a muscular build, such as an athlete, may have a BMI that is higher than 24.9. In cases like these, BMI is not an accurate measure of body fat.  · To determine if excess body fat is the cause of a BMI of 25 or higher, further assessments may need to be done by a health care provider.  · BMI is usually interpreted in the same way for men and women.  Where to find more information  For more information about BMI, including tools to quickly calculate your BMI, go to these websites:  · Centers for Disease Control and Prevention: www.cdc.gov  · American Heart Association: www.heart.org  · National Heart, Lung, and Blood [place]: www.nhlbi.nih.gov  Summary  · Body mass index (BMI) is a number that is calculated from a person's weight and height.  · BMI may help estimate how much of a person's weight is composed of fat. BMI can help identify those who may be at higher risk for certain medical problems.  · BMI can be measured using English measurements or metric measurements.  · BMI charts are used to identify whether you are underweight, normal " weight, overweight, or obese.  This information is not intended to replace advice given to you by your health care provider. Make sure you discuss any questions you have with your health care provider.  Document Revised: 09/09/2020 Document Reviewed: 07/17/2020  [employer]vier Patient Education © 2021 [employer]

## 2021-08-31 ENCOUNTER — LAB (OUTPATIENT)
Dept: LAB | Facility: OTHER | Age: 26
End: 2021-08-31

## 2021-08-31 ENCOUNTER — OFFICE VISIT (OUTPATIENT)
Dept: OBSTETRICS AND GYNECOLOGY | Facility: CLINIC | Age: 26
End: 2021-08-31

## 2021-08-31 VITALS
WEIGHT: 188.2 LBS | DIASTOLIC BLOOD PRESSURE: 78 MMHG | BODY MASS INDEX: 30.25 KG/M2 | HEIGHT: 66 IN | HEART RATE: 73 BPM | SYSTOLIC BLOOD PRESSURE: 122 MMHG

## 2021-08-31 DIAGNOSIS — Z01.419 ENCOUNTER FOR GYNECOLOGICAL EXAMINATION WITH PAPANICOLAOU SMEAR OF CERVIX: Primary | ICD-10-CM

## 2021-08-31 DIAGNOSIS — Z30.011 OCP (ORAL CONTRACEPTIVE PILLS) INITIATION: ICD-10-CM

## 2021-08-31 LAB
B-HCG UR QL: NEGATIVE
INTERNAL NEGATIVE CONTROL: NORMAL
INTERNAL POSITIVE CONTROL: NORMAL
Lab: NORMAL

## 2021-08-31 PROCEDURE — 81025 URINE PREGNANCY TEST: CPT | Performed by: NURSE PRACTITIONER

## 2021-08-31 PROCEDURE — 99395 PREV VISIT EST AGE 18-39: CPT | Performed by: NURSE PRACTITIONER

## 2021-08-31 RX ORDER — NORETHINDRONE ACETATE AND ETHINYL ESTRADIOL AND FERROUS FUMARATE 1MG-20(24)
1 KIT ORAL DAILY
Qty: 28 TABLET | Refills: 3 | Status: SHIPPED | OUTPATIENT
Start: 2021-08-31 | End: 2021-12-21 | Stop reason: SDUPTHER

## 2021-08-31 RX ORDER — NITROFURANTOIN 25; 75 MG/1; MG/1
100 CAPSULE ORAL 2 TIMES DAILY
Qty: 10 CAPSULE | Refills: 0 | Status: SHIPPED | OUTPATIENT
Start: 2021-08-31 | End: 2021-09-05

## 2021-08-31 RX ORDER — TRIAMCINOLONE ACETONIDE 55 UG/1
2 SPRAY, METERED NASAL DAILY
COMMUNITY

## 2021-08-31 RX ORDER — FLUCONAZOLE 150 MG/1
150 TABLET ORAL ONCE
Qty: 2 TABLET | Refills: 0 | Status: SHIPPED | OUTPATIENT
Start: 2021-08-31 | End: 2021-08-31

## 2021-08-31 NOTE — PROGRESS NOTES
Subjective   Chief Complaint   Patient presents with   • Gynecologic Exam     WWE   • Contraception     wants to start birth control     Tania Vital is a 25 y.o. year old  presenting to be seen for her annual exam.  Today she has no concerns but would like to start birth control. She is getting  Oct 25th. Has never been sexually active. Periods may be 4-6 weeks apart. Is currently under a lot of stress and feels that has contributed to her late period. UPT is negative.     Education given regarding options for contraception, including injectable contraception, IUD placement, oral contraceptives, xulane, NuvaRing, Nexplanon. Pt chooses OCPs.     Patient's last menstrual period was 2021 (approximate).    OB History        0    Para   0    Term   0       0    AB   0    Living   0       SAB   0    TAB   0    Ectopic   0    Molar   0    Multiple   0    Live Births   0                Current birth control method: abstinence.    She is not sexually active.     Past 6 month menstrual history:    Cycle Frequency: vary between 28 and 40 days   Menstrual cycle character: flow is typically moderately heavy   Cycle Duration: 4 - 5   Number of heavy days of flows: 2   Dysmenorrhea: moderate and severe   PMS: is not affecting her activities of daily living   Intermenstrual bleeding present: no   Post-coital bleeding present: not asked     She exercises regularly: yes.  She wears her seat belt:yes.  She has concerns about domestic violence: no.  Last colonoscopy: Never  Last DEXA: Never  Last MMG: Never  Last pap:  Never  History of abnormal PAP: N/A    The following portions of the patient's history were reviewed and updated as appropriate:problem list, current medications, allergies, past family history, past medical history, past social history and past surgical history.    Social History    Tobacco Use      Smoking status: Never Smoker      Smokeless tobacco: Never Used      Tobacco  "comment: No smokers in the household.    Social History     Substance and Sexual Activity   Alcohol Use No      Review of Systems   Constitutional: Negative.  Negative for chills and fever.   Respiratory: Negative.    Cardiovascular: Negative.    Gastrointestinal: Negative for nausea and vomiting.        IBS   Endocrine: Negative.    Genitourinary: Positive for menstrual problem (irregular). Negative for difficulty urinating, dysuria, frequency, pelvic pain, vaginal bleeding, vaginal discharge and vaginal pain.   Skin: Negative.         acne   Allergic/Immunologic: Positive for environmental allergies.   Neurological: Negative for dizziness, syncope, light-headedness and headaches.   Psychiatric/Behavioral: Negative for suicidal ideas. The patient is not nervous/anxious.          Objective   /78   Pulse 73   Ht 167.6 cm (66\")   Wt 85.4 kg (188 lb 3.2 oz)   LMP 07/25/2021 (Approximate)   Breastfeeding No   BMI 30.38 kg/m²     General:  well developed; well nourished  no acute distress   Skin:  No suspicious lesions seen  moderate acne noted   Thyroid: not examined   Breasts:  Examined in supine position  Symmetric without masses or skin dimpling  Nipples normal without inversion, lesions or discharge  There are no palpable axillary nodes   Abdomen: soft, non-tender; no masses  no umbilical or inguinal hernias are present  no hepato-splenomegaly  Normal findings: bowel sounds normal   Cardiac: Heart sounds are normal.  Regular rate and rhythm without murmur, gallop or rub.   Resp: Normal expansion.  Clear to auscultation.  No rales, rhonchi, or wheezing.   Psych: alert,oriented, in NAD with a full range of affect, normal behavior and no psychotic features   Pelvis: Uterus:  Clinical staff was present for exam  External genitalia:  normal appearance of the external genitalia including Bartholin's and Holly's glands.  :  urethral meatus normal;  Vaginal:  normal pink mucosa without prolapse or " lesions  Cervix:  normal appearance.  Uterus:  normal size, shape and consistency  Adnexa:  normal bimanual exam of the adnexa.  Rectal:  digital rectal exam not performed; anus visually normal appearing.     Lab Review   No data reviewed    Imaging  CT of abdomen/pelvis report       Diagnoses and all orders for this visit:    1. Encounter for gynecological examination with Papanicolaou smear of cervix (Primary)  -     Liquid-based Pap Smear, Screening    2. OCP (oral contraceptive pills) initiation  -     norethindrone-ethinyl estradiol-ferrous fumarate (Junel Fe 24) 1-20 MG-MCG(24) per tablet; Take 1 tablet by mouth Daily.  Dispense: 28 tablet; Refill: 3  -     POC Pregnancy, Urine    Pap results: I will send card in mail or call if abnormal. RTC annually for well woman exams.     SBE encouraged. Mother had breast cancer, was BRCA negative. MMG recommended at 41yo.     Doesn't smoke. Is not sexually active, therefore no need to test for STI.     No concerns about mental health.     Discussed all contraceptive options and pt chose OCPs. She was instructed how to start her birth control.  It should be started on Sunday following the onset of her next cycle.  Because it may take 1 month to become effective, the use of alternative contraception for one month was stressed.  The potential for breakthrough bleeding for up to 3 cycles was also emphasized. Discussed what to do if 1 and 2 or more days of pills are missed. Mild sdie effects discussed. Patient verbalizes understanding. All questions were answered. RTC in 3 months for FU on OCP.     This note was electronically signed.    Carrie Quijano, APRN  August 31, 2021

## 2021-08-31 NOTE — PROGRESS NOTES
Pt is going to [place] on her honeymoon. Will send antibiotic and diflucan to take with her PRN.

## 2021-09-07 LAB
LAB AP CASE REPORT: NORMAL
PATH INTERP SPEC-IMP: NORMAL

## 2021-09-08 ENCOUNTER — LAB (OUTPATIENT)
Dept: LAB | Facility: OTHER | Age: 26
End: 2021-09-08

## 2021-09-08 PROCEDURE — U0004 COV-19 TEST NON-CDC HGH THRU: HCPCS | Performed by: NURSE PRACTITIONER

## 2021-12-21 DIAGNOSIS — Z30.011 OCP (ORAL CONTRACEPTIVE PILLS) INITIATION: ICD-10-CM

## 2021-12-21 RX ORDER — NORETHINDRONE ACETATE AND ETHINYL ESTRADIOL AND FERROUS FUMARATE 1MG-20(24)
1 KIT ORAL DAILY
Qty: 28 TABLET | Refills: 0 | Status: SHIPPED | OUTPATIENT
Start: 2021-12-21 | End: 2022-01-24 | Stop reason: SDUPTHER

## 2022-01-24 ENCOUNTER — OFFICE VISIT (OUTPATIENT)
Dept: OBSTETRICS AND GYNECOLOGY | Facility: CLINIC | Age: 27
End: 2022-01-24

## 2022-01-24 VITALS
HEART RATE: 93 BPM | BODY MASS INDEX: 30.08 KG/M2 | SYSTOLIC BLOOD PRESSURE: 138 MMHG | HEIGHT: 66 IN | DIASTOLIC BLOOD PRESSURE: 72 MMHG | WEIGHT: 187.2 LBS

## 2022-01-24 DIAGNOSIS — Z30.41 ORAL CONTRACEPTIVE PILL SURVEILLANCE: ICD-10-CM

## 2022-01-24 PROCEDURE — 99213 OFFICE O/P EST LOW 20 MIN: CPT | Performed by: NURSE PRACTITIONER

## 2022-01-24 RX ORDER — NORETHINDRONE ACETATE AND ETHINYL ESTRADIOL AND FERROUS FUMARATE 1MG-20(24)
1 KIT ORAL DAILY
Qty: 28 TABLET | Refills: 12 | Status: SHIPPED | OUTPATIENT
Start: 2022-01-24 | End: 2023-01-30

## 2022-01-24 NOTE — PROGRESS NOTES
Subjective   Tania Vital is a 26 y.o. female.     History of Present Illness   Pr presents for 3 month FU on OCP initiation. Patient's last menstrual period was 12/28/2021 (approximate). Periods are regular, 3 days, light flow, mild cramping. She is pleased with this pill. Denies breast tenderness, headache, and nausea. Is good about taking it daily. No concerns today. Is now  and sexually active.     The following portions of the patient's history were reviewed and updated as appropriate: allergies, current medications, past family history, past medical history, past social history, past surgical history and problem list.    Review of Systems   Constitutional: Negative.  Negative for chills, fever, unexpected weight gain and unexpected weight loss.   Respiratory: Negative.    Cardiovascular: Negative.    Gastrointestinal: Negative.  Negative for nausea.   Genitourinary: Negative.  Negative for breast pain.   Neurological: Negative for dizziness, syncope and headache.   Psychiatric/Behavioral: Negative.  Negative for depressed mood. The patient is not nervous/anxious.        Objective   Physical Exam  Vitals reviewed.   Constitutional:       Appearance: Normal appearance. She is normal weight.   Cardiovascular:      Rate and Rhythm: Normal rate and regular rhythm.   Pulmonary:      Effort: Pulmonary effort is normal.   Neurological:      Mental Status: She is alert and oriented to person, place, and time.   Psychiatric:         Mood and Affect: Mood normal.         Behavior: Behavior normal.           Assessment/Plan   Diagnoses and all orders for this visit:    1. Oral contraceptive pill surveillance  -     norethindrone-ethinyl estradiol-ferrous fumarate (Junel Fe 24) 1-20 MG-MCG(24) per tablet; Take 1 tablet by mouth Daily.  Dispense: 28 tablet; Refill: 12      Continue OCP daily as prescribed. RTC annually for WWE or sooner PRN.     Reminded what to do if dose is missed.

## 2023-01-30 ENCOUNTER — LAB (OUTPATIENT)
Dept: LAB | Facility: OTHER | Age: 28
End: 2023-01-30
Payer: COMMERCIAL

## 2023-01-30 ENCOUNTER — OFFICE VISIT (OUTPATIENT)
Dept: OBSTETRICS AND GYNECOLOGY | Facility: CLINIC | Age: 28
End: 2023-01-30
Payer: COMMERCIAL

## 2023-01-30 VITALS
SYSTOLIC BLOOD PRESSURE: 114 MMHG | WEIGHT: 203 LBS | BODY MASS INDEX: 30.77 KG/M2 | DIASTOLIC BLOOD PRESSURE: 76 MMHG | HEART RATE: 82 BPM | HEIGHT: 68 IN

## 2023-01-30 DIAGNOSIS — Z01.419 ENCOUNTER FOR GYNECOLOGICAL EXAMINATION WITH PAPANICOLAOU SMEAR OF CERVIX: Primary | ICD-10-CM

## 2023-01-30 PROCEDURE — 99395 PREV VISIT EST AGE 18-39: CPT | Performed by: NURSE PRACTITIONER

## 2023-01-30 NOTE — PROGRESS NOTES
Subjective   Chief Complaint   Patient presents with   • Gynecologic Exam     WWYANELY Hurtado is a 27 y.o. year old  presenting to be seen for her annual exam.  Today she has no concerns. Stopped birth control pills and is using condoms. Considering TTC soon. Is not on prenatal vitamins but will get started on some.     Patient's last menstrual period was 2023 (approximate).    OB History        0    Para   0    Term   0       0    AB   0    Living   0       SAB   0    IAB   0    Ectopic   0    Molar   0    Multiple   0    Live Births   0                Current birth control method: condoms    She is sexually active. Has not had new partners. Would not like STI testing.     Past 6 month menstrual history:    Cycle Frequency: regular, predictable and consistent every 28 - 32 days   Menstrual cycle character: flow is typically moderately heavy   Cycle Duration: 3-4   Number of heavy days of flows: 0   Dysmenorrhea: moderate and severe   PMS: is not affecting her activities of daily living   Intermenstrual bleeding present: no   Post-coital bleeding present: not asked     She exercises regularly: yes.  She wears her seat belt:yes.  She has concerns about domestic violence: no.  Last colonoscopy: Never  Last DEXA: Never  Last MMG: Never, mom had breast cancer in 40's  Last pap:  21  History of abnormal PAP: No    The following portions of the patient's history were reviewed and updated as appropriate:problem list, current medications, allergies, past family history, past medical history, past social history and past surgical history.    Social History    Tobacco Use      Smoking status: Never      Smokeless tobacco: Never      Tobacco comments: No smokers in the household.    Social History     Substance and Sexual Activity   Alcohol Use No      Review of Systems   Constitutional: Negative.  Negative for chills and fever.   Respiratory: Negative.    Cardiovascular: Negative.   "  Gastrointestinal: Negative for abdominal pain, constipation, diarrhea, nausea and vomiting.   Endocrine: Negative.    Genitourinary: Negative for difficulty urinating, dysuria, frequency, menstrual problem, pelvic pain, vaginal bleeding, vaginal discharge and vaginal pain.   Skin: Negative.    Allergic/Immunologic: Positive for environmental allergies.   Neurological: Negative for dizziness, syncope, light-headedness and headaches.   Psychiatric/Behavioral: Negative for suicidal ideas. The patient is not nervous/anxious.          Objective   /76   Pulse 82   Ht 171.5 cm (67.5\")   Wt 92.1 kg (203 lb)   LMP 01/07/2023 (Approximate)   Breastfeeding No   BMI 31.33 kg/m²     General:  well developed; well nourished  no acute distress   Skin:  No suspicious lesions seen   Thyroid: not examined   Breasts:  Examined in supine position  Symmetric without masses or skin dimpling  Nipples normal without inversion, lesions or discharge  There are no palpable axillary nodes   Abdomen: soft, non-tender; no masses  no umbilical or inguinal hernias are present  no hepato-splenomegaly  Normal findings: bowel sounds normal   Cardiac: Heart sounds are normal.  Regular rate and rhythm without murmur, gallop or rub.   Resp: Normal expansion.  Clear to auscultation.  No rales, rhonchi, or wheezing.   Psych: alert,oriented, in NAD with a full range of affect, normal behavior and no psychotic features   Pelvis: Uterus:  Clinical staff was present for exam  External genitalia:  normal appearance of the external genitalia including Bartholin's and Schul[place]'s glands.  :  urethral meatus normal;  Vaginal:  normal pink mucosa without prolapse or lesions  Cervix:  normal appearance.  Uterus:  normal size, shape and consistency  Adnexa:  normal bimanual exam of the adnexa.  Rectal:  digital rectal exam not performed; anus visually normal appearing.     Lab Review   No data reviewed    Imaging  CT of abdomen/pelvis report     "   Diagnoses and all orders for this visit:    1. Encounter for gynecological examination with Papanicolaou smear of cervix (Primary)  -     LIQUID-BASED PAP SMEAR, [facility] ([facility])       Pap results: I will send card in mail or call if abnormal. RTC annually for well woman exams.     SBE encouraged. Mother had breast cancer, was BRCA negative. MMG recommended at 34yo.     Doesn't smoke.  and monogamous. No need to test for STI.     No concerns about mental health.     Declines hormonal contraceptives. Will use condoms until ready to TTC. Discussed tracking cycles and timing intercourse. Begin prenatal vitamins. RTC if periods become irregular or no successful pregnancy in 1 year of TTC.     This note was electronically signed.    Carrie Quijano, APRN  January 30, 2023

## 2023-02-02 LAB — REF LAB TEST METHOD: NORMAL

## (undated) DEVICE — CANN SMPL SOFTECH BIFLO ETCO2 A/M 7FT

## (undated) DEVICE — SINGLE-USE BIOPSY FORCEPS: Brand: RADIAL JAW 4

## (undated) DEVICE — BITEBLOCK ENDO W/STRAP 60F A/ LF DISP